# Patient Record
Sex: FEMALE | Race: WHITE | NOT HISPANIC OR LATINO | Employment: FULL TIME | URBAN - METROPOLITAN AREA
[De-identification: names, ages, dates, MRNs, and addresses within clinical notes are randomized per-mention and may not be internally consistent; named-entity substitution may affect disease eponyms.]

---

## 2017-09-28 ENCOUNTER — APPOINTMENT (OUTPATIENT)
Dept: LAB | Facility: MEDICAL CENTER | Age: 47
End: 2017-09-28
Payer: COMMERCIAL

## 2017-09-28 ENCOUNTER — TRANSCRIBE ORDERS (OUTPATIENT)
Dept: ADMINISTRATIVE | Facility: HOSPITAL | Age: 47
End: 2017-09-28

## 2017-09-28 ENCOUNTER — APPOINTMENT (OUTPATIENT)
Dept: URGENT CARE | Facility: MEDICAL CENTER | Age: 47
End: 2017-09-28
Payer: COMMERCIAL

## 2017-09-28 DIAGNOSIS — Z13.9 SCREENING FOR CONDITION: Primary | ICD-10-CM

## 2017-09-28 DIAGNOSIS — Z13.9 SCREENING FOR CONDITION: ICD-10-CM

## 2017-09-28 LAB
CHOLEST SERPL-MCNC: 171 MG/DL (ref 50–200)
EST. AVERAGE GLUCOSE BLD GHB EST-MCNC: 108 MG/DL
HBA1C MFR BLD: 5.4 % (ref 4.2–6.3)
HDLC SERPL-MCNC: 85 MG/DL (ref 40–60)
LDLC SERPL CALC-MCNC: 75 MG/DL (ref 0–100)
TRIGL SERPL-MCNC: 56 MG/DL
TSH SERPL DL<=0.05 MIU/L-ACNC: 0.9 UIU/ML (ref 0.36–3.74)

## 2017-09-28 PROCEDURE — 84443 ASSAY THYROID STIM HORMONE: CPT

## 2017-09-28 PROCEDURE — 80061 LIPID PANEL: CPT

## 2017-09-28 PROCEDURE — 83036 HEMOGLOBIN GLYCOSYLATED A1C: CPT

## 2017-09-28 PROCEDURE — 36415 COLL VENOUS BLD VENIPUNCTURE: CPT

## 2019-06-20 ENCOUNTER — HOSPITAL ENCOUNTER (OUTPATIENT)
Dept: RADIOLOGY | Facility: IMAGING CENTER | Age: 49
Discharge: HOME | End: 2019-06-20
Payer: COMMERCIAL

## 2019-06-20 ENCOUNTER — TRANSCRIBE ORDERS (OUTPATIENT)
Dept: ADMINISTRATIVE | Facility: HOSPITAL | Age: 49
End: 2019-06-20

## 2019-06-20 DIAGNOSIS — Z13.9 ENCOUNTER FOR HEALTH-RELATED SCREENING: ICD-10-CM

## 2019-06-20 DIAGNOSIS — M17.9 OSTEOARTHRITIS OF KNEE, UNSPECIFIED: Primary | ICD-10-CM

## 2019-06-20 DIAGNOSIS — M17.9 OSTEOARTHRITIS OF KNEE, UNSPECIFIED: ICD-10-CM

## 2019-06-20 PROCEDURE — 73562 X-RAY EXAM OF KNEE 3: CPT

## 2019-07-05 ENCOUNTER — APPOINTMENT (OUTPATIENT)
Dept: URGENT CARE | Facility: MEDICAL CENTER | Age: 49
End: 2019-07-05

## 2019-07-05 ENCOUNTER — APPOINTMENT (OUTPATIENT)
Dept: LAB | Facility: MEDICAL CENTER | Age: 49
End: 2019-07-05

## 2019-07-05 DIAGNOSIS — Z13.9 ENCOUNTER FOR HEALTH-RELATED SCREENING: ICD-10-CM

## 2019-07-05 LAB
CHOLEST SERPL-MCNC: 198 MG/DL (ref 50–200)
EST. AVERAGE GLUCOSE BLD GHB EST-MCNC: 111 MG/DL
HBA1C MFR BLD: 5.5 % (ref 4.2–6.3)
HDLC SERPL-MCNC: 81 MG/DL (ref 40–60)
LDLC SERPL CALC-MCNC: 99 MG/DL (ref 0–100)
NONHDLC SERPL-MCNC: 117 MG/DL
TRIGL SERPL-MCNC: 90 MG/DL

## 2019-07-05 PROCEDURE — 80061 LIPID PANEL: CPT

## 2019-07-05 PROCEDURE — 83036 HEMOGLOBIN GLYCOSYLATED A1C: CPT

## 2019-07-05 PROCEDURE — 36415 COLL VENOUS BLD VENIPUNCTURE: CPT

## 2020-11-20 ENCOUNTER — TRANSCRIBE ORDERS (OUTPATIENT)
Dept: NEUROSURGERY | Facility: CLINIC | Age: 50
End: 2020-11-20

## 2020-11-20 DIAGNOSIS — G89.4 CHRONIC PAIN SYNDROME: Primary | ICD-10-CM

## 2020-12-28 ENCOUNTER — CONSULT (OUTPATIENT)
Dept: NEUROSURGERY | Facility: CLINIC | Age: 50
End: 2020-12-28
Payer: COMMERCIAL

## 2020-12-28 VITALS
WEIGHT: 216 LBS | DIASTOLIC BLOOD PRESSURE: 84 MMHG | RESPIRATION RATE: 16 BRPM | HEART RATE: 90 BPM | TEMPERATURE: 98.4 F | SYSTOLIC BLOOD PRESSURE: 126 MMHG | HEIGHT: 61 IN | BODY MASS INDEX: 40.78 KG/M2

## 2020-12-28 DIAGNOSIS — M54.16 LUMBAR RADICULOPATHY: ICD-10-CM

## 2020-12-28 DIAGNOSIS — G89.4 CHRONIC PAIN SYNDROME: Primary | ICD-10-CM

## 2020-12-28 PROCEDURE — 99244 OFF/OP CNSLTJ NEW/EST MOD 40: CPT | Performed by: NEUROLOGICAL SURGERY

## 2020-12-28 RX ORDER — CEFAZOLIN SODIUM 2 G/50ML
2000 SOLUTION INTRAVENOUS ONCE
Status: CANCELLED | OUTPATIENT
Start: 2021-02-23 | End: 2020-12-28

## 2020-12-28 RX ORDER — ARIPIPRAZOLE 2 MG/1
TABLET ORAL DAILY
COMMUNITY
Start: 2020-11-13

## 2020-12-28 RX ORDER — PREGABALIN 100 MG/1
100 CAPSULE ORAL 3 TIMES DAILY
COMMUNITY
Start: 2020-11-27

## 2020-12-28 RX ORDER — ZOLPIDEM TARTRATE 5 MG/1
5 TABLET ORAL
COMMUNITY
Start: 2020-11-13

## 2020-12-28 RX ORDER — METAXALONE 800 MG/1
800 TABLET ORAL 2 TIMES DAILY
COMMUNITY
Start: 2020-12-14

## 2020-12-28 RX ORDER — VENLAFAXINE HYDROCHLORIDE 150 MG/1
CAPSULE, EXTENDED RELEASE ORAL
COMMUNITY
Start: 2019-03-11

## 2020-12-28 RX ORDER — CHLORHEXIDINE GLUCONATE 0.12 MG/ML
15 RINSE ORAL ONCE
Status: CANCELLED | OUTPATIENT
Start: 2020-12-28 | End: 2020-12-28

## 2020-12-28 RX ORDER — TOPIRAMATE 25 MG/1
25 TABLET ORAL 2 TIMES DAILY
COMMUNITY
Start: 2020-11-30

## 2020-12-28 RX ORDER — LORATADINE 10 MG/1
10 TABLET ORAL AS NEEDED
COMMUNITY
Start: 2011-06-17

## 2020-12-28 RX ORDER — HYDROCODONE BITARTRATE AND ACETAMINOPHEN 5; 325 MG/1; MG/1
1 TABLET ORAL 3 TIMES DAILY PRN
COMMUNITY
Start: 2020-12-01

## 2021-01-04 ENCOUNTER — HOSPITAL ENCOUNTER (OUTPATIENT)
Dept: MRI IMAGING | Facility: HOSPITAL | Age: 51
Discharge: HOME | End: 2021-01-04
Payer: COMMERCIAL

## 2021-01-04 ENCOUNTER — HOSPITAL ENCOUNTER (OUTPATIENT)
Dept: RADIOLOGY | Facility: HOSPITAL | Age: 51
Discharge: HOME | End: 2021-01-04

## 2021-01-04 DIAGNOSIS — G89.4 CHRONIC PAIN SYNDROME: ICD-10-CM

## 2021-01-04 DIAGNOSIS — M54.16 LUMBAR RADICULOPATHY: ICD-10-CM

## 2021-01-04 PROCEDURE — G1004 CDSM NDSC: HCPCS

## 2021-01-04 PROCEDURE — 72146 MRI CHEST SPINE W/O DYE: CPT

## 2021-01-05 ENCOUNTER — TELEPHONE (OUTPATIENT)
Dept: NEUROSURGERY | Facility: CLINIC | Age: 51
End: 2021-01-05

## 2021-01-05 NOTE — TELEPHONE ENCOUNTER
Telephoned patient as a f/u to call form MRI regarding abnormalfinding; Impression --  MRI thopracic spine ordered by Dr Indira Keys during recent office visit 12/28/20    IMPRESSION:  1  Minimal degenerative change without evidence of critical stenosis throughout the thoracic spine  2   Right hepatic lobe 16 mm lobulated probable cyst   Correlate with ultrasound      Informed patient I will notify  PCP for f/u  She reports is followed by weight management Dr Rhonda Grullon, has a fatty liver  this   ordered a liver US which she completed last week , This Dr did not mention abnormal findings  She will call the office and request they fax US liver report to her PCP  PCP office notified DR Adilson De Los Santos , spoke with Tracy Tam on nurse line informed of the above  She reports cannot access MRI Thoracici spine in Epic at this time  informed I will have report faxed to the office today   Verified Fax   Earline Bhatia, [facility] [address]   [place] [address]   [phone] (Fax)

## 2021-01-27 ENCOUNTER — OFFICE VISIT (OUTPATIENT)
Dept: NEUROSURGERY | Facility: CLINIC | Age: 51
End: 2021-01-27

## 2021-01-27 ENCOUNTER — LAB (OUTPATIENT)
Dept: LAB | Facility: IMAGING CENTER | Age: 51
End: 2021-01-27
Payer: COMMERCIAL

## 2021-01-27 VITALS
WEIGHT: 211 LBS | SYSTOLIC BLOOD PRESSURE: 120 MMHG | TEMPERATURE: 98.1 F | BODY MASS INDEX: 39.84 KG/M2 | HEART RATE: 86 BPM | DIASTOLIC BLOOD PRESSURE: 77 MMHG | RESPIRATION RATE: 16 BRPM | HEIGHT: 61 IN

## 2021-01-27 DIAGNOSIS — Z79.891 LONG TERM CURRENT USE OF OPIATE ANALGESIC: ICD-10-CM

## 2021-01-27 DIAGNOSIS — M54.16 LUMBAR RADICULOPATHY: ICD-10-CM

## 2021-01-27 DIAGNOSIS — G89.4 CHRONIC PAIN SYNDROME: Primary | ICD-10-CM

## 2021-01-27 DIAGNOSIS — G89.4 CHRONIC PAIN SYNDROME: ICD-10-CM

## 2021-01-27 DIAGNOSIS — M47.816 SPONDYLOSIS WITHOUT MYELOPATHY OR RADICULOPATHY, LUMBAR REGION: ICD-10-CM

## 2021-01-27 LAB
ALBUMIN SERPL BCP-MCNC: 3.9 G/DL (ref 3.5–5)
ALP SERPL-CCNC: 82 U/L (ref 46–116)
ALT SERPL W P-5'-P-CCNC: 27 U/L (ref 12–78)
ANION GAP SERPL CALCULATED.3IONS-SCNC: 2 MMOL/L (ref 4–13)
APTT PPP: 29 SECONDS (ref 23–37)
AST SERPL W P-5'-P-CCNC: 19 U/L (ref 5–45)
B-HCG SERPL-ACNC: 8 MIU/ML
BASOPHILS # BLD AUTO: 0.04 THOUSANDS/ΜL (ref 0–0.1)
BASOPHILS NFR BLD AUTO: 1 % (ref 0–1)
BILIRUB SERPL-MCNC: 0.32 MG/DL (ref 0.2–1)
BILIRUB UR QL STRIP: NEGATIVE
BUN SERPL-MCNC: 11 MG/DL (ref 5–25)
CALCIUM SERPL-MCNC: 9.1 MG/DL (ref 8.3–10.1)
CHLORIDE SERPL-SCNC: 109 MMOL/L (ref 100–108)
CLARITY UR: CLEAR
CO2 SERPL-SCNC: 29 MMOL/L (ref 21–32)
COLOR UR: YELLOW
CREAT SERPL-MCNC: 0.78 MG/DL (ref 0.6–1.3)
EOSINOPHIL # BLD AUTO: 0.07 THOUSAND/ΜL (ref 0–0.61)
EOSINOPHIL NFR BLD AUTO: 2 % (ref 0–6)
ERYTHROCYTE [DISTWIDTH] IN BLOOD BY AUTOMATED COUNT: 13.3 % (ref 11.6–15.1)
EST. AVERAGE GLUCOSE BLD GHB EST-MCNC: 117 MG/DL
GFR SERPL CREATININE-BSD FRML MDRD: 89 ML/MIN/1.73SQ M
GLUCOSE P FAST SERPL-MCNC: 105 MG/DL (ref 65–99)
GLUCOSE UR STRIP-MCNC: NEGATIVE MG/DL
HBA1C MFR BLD: 5.7 %
HCT VFR BLD AUTO: 41.6 % (ref 34.8–46.1)
HGB BLD-MCNC: 12.8 G/DL (ref 11.5–15.4)
HGB UR QL STRIP.AUTO: NEGATIVE
IMM GRANULOCYTES # BLD AUTO: 0.01 THOUSAND/UL (ref 0–0.2)
IMM GRANULOCYTES NFR BLD AUTO: 0 % (ref 0–2)
INR PPP: 0.98 (ref 0.84–1.19)
KETONES UR STRIP-MCNC: NEGATIVE MG/DL
LEUKOCYTE ESTERASE UR QL STRIP: NEGATIVE
LYMPHOCYTES # BLD AUTO: 1.81 THOUSANDS/ΜL (ref 0.6–4.47)
LYMPHOCYTES NFR BLD AUTO: 41 % (ref 14–44)
MCH RBC QN AUTO: 29.3 PG (ref 26.8–34.3)
MCHC RBC AUTO-ENTMCNC: 30.8 G/DL (ref 31.4–37.4)
MCV RBC AUTO: 95 FL (ref 82–98)
MONOCYTES # BLD AUTO: 0.31 THOUSAND/ΜL (ref 0.17–1.22)
MONOCYTES NFR BLD AUTO: 7 % (ref 4–12)
NEUTROPHILS # BLD AUTO: 2.21 THOUSANDS/ΜL (ref 1.85–7.62)
NEUTS SEG NFR BLD AUTO: 49 % (ref 43–75)
NITRITE UR QL STRIP: NEGATIVE
NRBC BLD AUTO-RTO: 0 /100 WBCS
PH UR STRIP.AUTO: 7 [PH]
PLATELET # BLD AUTO: 305 THOUSANDS/UL (ref 149–390)
PMV BLD AUTO: 10.2 FL (ref 8.9–12.7)
POTASSIUM SERPL-SCNC: 4.2 MMOL/L (ref 3.5–5.3)
PROT SERPL-MCNC: 7.7 G/DL (ref 6.4–8.2)
PROT UR STRIP-MCNC: NEGATIVE MG/DL
PROTHROMBIN TIME: 13 SECONDS (ref 11.6–14.5)
RBC # BLD AUTO: 4.37 MILLION/UL (ref 3.81–5.12)
SODIUM SERPL-SCNC: 140 MMOL/L (ref 136–145)
SP GR UR STRIP.AUTO: 1.01 (ref 1–1.03)
UROBILINOGEN UR QL STRIP.AUTO: 0.2 E.U./DL
WBC # BLD AUTO: 4.45 THOUSAND/UL (ref 4.31–10.16)

## 2021-01-27 PROCEDURE — 85025 COMPLETE CBC W/AUTO DIFF WBC: CPT

## 2021-01-27 PROCEDURE — 80053 COMPREHEN METABOLIC PANEL: CPT

## 2021-01-27 PROCEDURE — 85730 THROMBOPLASTIN TIME PARTIAL: CPT

## 2021-01-27 PROCEDURE — 83036 HEMOGLOBIN GLYCOSYLATED A1C: CPT

## 2021-01-27 PROCEDURE — 36415 COLL VENOUS BLD VENIPUNCTURE: CPT

## 2021-01-27 PROCEDURE — 81003 URINALYSIS AUTO W/O SCOPE: CPT | Performed by: NEUROLOGICAL SURGERY

## 2021-01-27 PROCEDURE — 85610 PROTHROMBIN TIME: CPT

## 2021-01-27 PROCEDURE — PREOP: Performed by: NURSE PRACTITIONER

## 2021-01-27 PROCEDURE — 84702 CHORIONIC GONADOTROPIN TEST: CPT

## 2021-01-27 RX ORDER — PHENTERMINE HYDROCHLORIDE 37.5 MG/1
37.5 CAPSULE ORAL EVERY MORNING
COMMUNITY

## 2021-01-27 NOTE — PROGRESS NOTES
Assessment/Plan:    Long term current use of opiate analgesic  · As addressed in HPI  · Prescribed by pain management     Spondylosis without myelopathy or radiculopathy, lumbar region  · As addressed in HPI  · Chronic current use of opiates  · Status post spinal cord simulator  trial , scheduled for surgery for permanent placement     Chronic pain syndrome  As detailed in HPI  She reports Dr Latha Mcfadden has explained in great detail, the procedure, the risks and benefits of surgery,  expected postoperative course, and answered all questions , also providing contact information if future questions arise, she  wishes to proceed with surgery  [employer] [facility]   Dr Latha Mcfadden previously  obtained verbal and written consent  Plan  Preoperative Assessments :   Prior General  Anesthesia Reactions---denies    Exercise Capacity ---   She denies exertional symptoms  of dyspnea or chest pain when climbing 2 flights of stairs or when walking 2 city blocks  Nicotine dependence ----  Does not smoke  Pulmonary: HO TORIE prescribed CPAP,noncompliant with use , will bring on sx day , explained rational     Personal history of venous thromboembolic disease--denies  GI/HepaticMRI Right hepatic lobe 16 mm lobulated probable cyst PCP following, has for many years  Bleeding Tendency ---denies easily bruising , or spontaneous nose or gum bleeds    Nickel or metal reaction/allergy:  History of cancer or other health condition that requires routine MRI imagining---denies conditions    Surgery Clearance:  PCP/Medical Clearance- pending completion scheduled for 2/4/2021     Cardiac:-no clearance required but need EKG required    PAT--done today  And reviewed  Imagining requirement: MRI Thoracic spine 1 4 2021  Images viewed in PACS   Psychology clearance: ; note from [id number] faxed 11/20/20 "cleared for spinal cord stimulator "      In preparation for surgery the following information is explained  Preoperative written instructions provided and reviewed, explained  Medication exclusion  list provided and reviewed - do not take 7 days prior surgery or 14 days postoperatively including  OTC, supplements,  ASA containing  products (Excedrin migraine) and NSAID  Do not drive for 2 week after surgery (deviation from this  restriction is at the discretion of the surgeon)  Explained pre operative skin preparation hygiene care use of products Hibiclens (chlorhexidine) and use Mauro cloth wipes as per protocol, products provided  Explained measures performed on surgery day preop to decrease infection risk and postoperative  A prophylactic  ABX is prescribed the day prior surgery,   start night of surgery and continue as directed   Advised again to read and review the Neurosurgery education booklet   Postoperative activity restrictions were reviewed , follow the basic "BLTs" no bending, no lifting greater than 10 lbs  , no twisting, and no stretching thru 6 weeks post op  Can ambulate as tolerated immediately post op   Avoid repetitive pushing, pulling, or rotating movement of upper extremities  Ambulate as tolerated immediately post op  Take the minimum of 4 short walks per day  Will receive postoperative discharge written instructions explaining incision care and general body hygiene instructions such as when showers can resume  Pain management --current use of opiate prescribed by Dr Italo Muñoz , baldomero use this supply first for postoperative pain management  Dr Chela Dixon office relinquishes opiate prescribing to NSx x 5 days  Postoperative pain management, opioid induced constipation, and bowel hygiene measures discussed in great detail  Product Rep is  present during surgical procedure and subsequent postoperative  visits  Subjective: preoperative H & P     Patient ID: Iris Burnham is a 48 y o  female     HPI   Has a longstanding history of chronic pain syndrome secondary to lumbar radiculopathy and spondylosis    Location of pain in low back and bilateral lower extremities  R>L  Pain distribution L5 nerve root  Characterizes pain as severe and throbbing quality  Managed by Dr Kaitlin [facility]  She failed conservative treatment of physical therapy and steroid injections  Dr Kaitlin Boston performed a [employer] spinal cord stimulator trial 11/9/2020  For which she reports achieving significant relief of chronic pain symptoms, greater than 50 %  Denies prior back surgery  Currently treating with opiate prescribed by Dr Kaitlin Boston  She consulted with DR Gilford Gash 12/28/20 after assessment and review determined she is an appropriate candidate to proceed with surgery  Surgery scheduled for 2/23/2021  with Dr Gilford Gash for  [address], RIGHT (Right Spine Thoracic)  ----[employer]                 REVIEW OF SYSTEMS  Review of Systems   Musculoskeletal: Positive for back pain (occasionally radiates to legs) and gait problem (occasional)  Neurological:        Neuropathy in feet and fingers B/L   Psychiatric/Behavioral: Positive for dysphoric mood (controlled with meds)  All other systems reviewed and are negative          Meds/Allergies     Current Outpatient Medications   Medication Sig Dispense Refill    ARIPiprazole (ABILIFY) 2 mg tablet Take by mouth daily       HYDROcodone-acetaminophen (NORCO) 5-325 mg per tablet Take 1 tablet by mouth 3 (three) times a day as needed      loratadine (Claritin) 10 mg tablet Take 10 mg by mouth as needed       metaxalone (SKELAXIN) 800 mg tablet Take 800 mg by mouth 2 (two) times a day      phentermine 37 5 MG capsule Take 37 5 mg by mouth every morning      pregabalin (LYRICA) 100 mg capsule Take 100 mg by mouth 3 (three) times a day      topiramate (TOPAMAX) 25 mg tablet Take 25 mg by mouth 2 (two) times a day      venlafaxine (EFFEXOR-XR) 150 mg 24 hr capsule TAKE 1 CAPSULE DAILY      zolpidem (AMBIEN) 5 mg tablet Take 5 mg by mouth daily at bedtime        No current facility-administered medications for this visit  Allergies   Allergen Reactions    Bupropion      Headache       PAST HISTORY    History reviewed  No pertinent past medical history  Past Surgical History:   Procedure Laterality Date    CARPAL TUNNEL RELEASE Bilateral     CRANIECTOMY SUBOCCIPITAL W/ CERVICAL LAMINECTOMY / CHIARI  07/2017    TONSILLECTOMY      and uvula    TUBAL LIGATION  1996       Social History     Tobacco Use    Smoking status: Never Smoker    Smokeless tobacco: Never Used   Substance Use Topics    Alcohol use: Never     Frequency: Never    Drug use: Never       Family History   Problem Relation Age of Onset    Colon cancer Father    [facility] HIV Brother    [facility] Spina bifida Son        The following portions of the patient's history were reviewed and updated as appropriate: allergies, current medications, past family history, past medical history, past social history, past surgical history and problem list       EXAM    Vitals:Blood pressure 120/77, pulse 86, temperature 98 1 °F (36 7 °C), temperature source Tympanic, resp  rate 16, height 5' 1" (1 549 m), weight 95 7 kg (211 lb)  ,Body mass index is 39 87 kg/m²  Physical Exam  Vitals signs and nursing note reviewed  Constitutional:       Appearance: Normal appearance  HENT:      Head: Normocephalic and atraumatic  Eyes:      General: No scleral icterus  Right eye: No discharge  Left eye: No discharge  Conjunctiva/sclera: Conjunctivae normal    Neck:      Musculoskeletal: Normal range of motion  Cardiovascular:      Rate and Rhythm: Normal rate and regular rhythm  Pulses: Normal pulses  Heart sounds: Normal heart sounds  Pulmonary:      Effort: Pulmonary effort is normal  No respiratory distress  Breath sounds: Normal breath sounds  Abdominal:      General: Bowel sounds are normal       Palpations: Abdomen is soft  Comments: Obese abdomen   Musculoskeletal:         General: No swelling or tenderness  Right lower leg: No edema  Left lower leg: No edema  Skin:     General: Skin is warm and dry  Neurological:      General: No focal deficit present  Mental Status: She is alert and oriented to person, place, and time  Gait: Gait is intact  Psychiatric:         Behavior: Behavior normal          Neurologic Exam     Mental Status   Oriented to person, place, and time  Level of consciousness: alert    Motor Exam     Strength   Right iliopsoas: 5/5  Left iliopsoas: 5/5  Right quadriceps: 5/5  Left quadriceps: 5/5  Right hamstrin/5  Left hamstrin/5  Right anterior tibial: 5/5  Left anterior tibial: 5/5  Right gastroc: 5/5  Left gastroc: 5/5    Gait, Coordination, and Reflexes     Gait  Gait: normal      Imaging Studies  Mri Thoracic Spine Without Contrast    Result Date: 2021  Narrative: MRI THORACIC SPINE WITHOUT CONTRAST INDICATION: G89 4: Chronic pain syndrome M54 16: Radiculopathy, lumbar region  Presurgical evaluation for spinal cord stimulator  COMPARISON:  None  TECHNIQUE:  Sagittal T1, sagittal T2, sagittal inversion recovery, axial T2,  axial 2D MERGE  IMAGE QUALITY: Diagnostic  FINDINGS: ALIGNMENT: Normal alignment of the thoracic spine  No compression fracture  No subluxation  No scoliosis  MARROW SIGNAL:  Normal marrow signal is identified within the visualized bony structures with the exception of minimal reactive endplate marrow changes at mid and lower thoracic levels  No discrete marrow lesion  THORACIC CORD: Normal signal within the thoracic cord  PARAVERTEBRAL SOFT TISSUES:  Right hepatic lobe 16 mm cystic appearing lesion  Correlate with ultrasound  THORACIC DEGENERATIVE CHANGE: Minimal degenerative changes without focal disc herniation or significant foraminal stenosis  Impression: 1    Minimal degenerative change without evidence of critical stenosis throughout the thoracic spine  2   Right hepatic lobe 16 mm lobulated probable cyst   Correlate with ultrasound  Workstation performed: [id number]         I have personally reviewed pertinent reports     and I have personally reviewed pertinent films in PACS

## 2021-01-28 ENCOUNTER — TELEPHONE (OUTPATIENT)
Dept: NEUROSURGERY | Facility: CLINIC | Age: 51
End: 2021-01-28

## 2021-01-28 PROBLEM — Z79.891 LONG TERM CURRENT USE OF OPIATE ANALGESIC: Status: ACTIVE | Noted: 2021-01-28

## 2021-01-28 PROBLEM — M47.816 SPONDYLOSIS WITHOUT MYELOPATHY OR RADICULOPATHY, LUMBAR REGION: Status: ACTIVE | Noted: 2021-01-28

## 2021-01-28 PROBLEM — G89.4 CHRONIC PAIN SYNDROME: Status: ACTIVE | Noted: 2021-01-28

## 2021-01-28 PROBLEM — M54.16 LUMBAR RADICULOPATHY: Status: ACTIVE | Noted: 2021-01-28

## 2021-01-28 NOTE — TELEPHONE ENCOUNTER
Patient gave provider disability forms to complete 1/27/2021    Tried to reach patient, no answer, [facility]    Forms are completed, employee information requests employee signature and date   Asked patient to return call to my direct line to advise if she wishes to pick forms up, have them emailed to her or faxed to [address] [place] [address]

## 2021-01-28 NOTE — TELEPHONE ENCOUNTER
Patient returned call, LMOM asking for forms be emailed to her personal email provided  Emailed forms, returned call and spoke to patient, let her know they were sent to her via email and to please call if she has any furhter needs  Patient was appreciative

## 2021-01-28 NOTE — ASSESSMENT & PLAN NOTE
As detailed in HPI  She reports Dr Terry Nickerson has explained in great detail, the procedure, the risks and benefits of surgery,  expected postoperative course, and answered all questions , also providing contact information if future questions arise, she  wishes to proceed with surgery  [facility]   Dr Terry Nickerson previously  obtained verbal and written consent  Plan  Preoperative Assessments :   Prior General  Anesthesia Reactions---denies    Exercise Capacity ---   She denies exertional symptoms  of dyspnea or chest pain when climbing 2 flights of stairs or when walking 2 city blocks  Nicotine dependence ----  Does not smoke  Pulmonary----HO TORIE prescribed CPAP,noncompliant with use , will bring on sx day , explained rational     Personal history of venous thromboembolic disease--denies  GI/Hepatic---MRI Right hepatic lobe 16 mm lobulated probable cyst PCP following, has for many years  Bleeding Tendency ---denies easily bruising , or spontaneous nose or gum bleeds    Nickel or metal reaction/allergy--denies   History of cancer or other health condition that requires routine MRI imagining---denies conditions    Surgery Clearance:  PCP/Medical Clearance- pending completion scheduled for 2/4/2021     Cardiac:-no clearance required but need EKG required    PAT--done today  And reviewed  Imagining requirement: MRI Thoracic spine 1 4 2021  Images viewed in PACS   Psychology clearance: ; note from [facility] faxed 11/20/20 "cleared for spinal cord stimulator "  In preparation for surgery the following information is explained  Preoperative written instructions provided and reviewed, explained  Medication exclusion  list provided and reviewed - do not take 7 days prior surgery or 14 days postoperatively including  OTC, supplements,  ASA containing  products (Excedrin migraine) and NSAID      Do not drive for 2 week after surgery (deviation from this  restriction is at the discretion of the surgeon)  Explained pre operative skin preparation hygiene care use of products Hibiclens (chlorhexidine) and use Mauro cloth wipes as per protocol, products provided  Explained measures performed on surgery day preop to decrease infection risk and postoperative  A prophylactic  ABX is prescribed the day prior surgery,   start night of surgery and continue as directed   Advised again to read and review the Neurosurgery education booklet   Postoperative activity restrictions were reviewed , follow the basic "BLTs" no bending, no lifting greater than 10 lbs  , no twisting, and no stretching thru 6 weeks post op  Can ambulate as tolerated immediately post op   Avoid repetitive pushing, pulling, or rotating movement of upper extremities  Ambulate as tolerated immediately post op  Take the minimum of 4 short walks per day  Will receive postoperative discharge written instructions explaining incision care and general body hygiene instructions such as when showers can resume  Pain management --current use of opiate prescribed by Dr Rc Adkins , baldomero use this supply first for postoperative pain management  Dr Moustapha Kimball office relinquishes opiate prescribing to NSx x 5 days  Postoperative pain management, opioid induced constipation, and bowel hygiene measures discussed in great detail  Product Rep is  present during surgical procedure and subsequent postoperative  visits

## 2021-02-09 NOTE — PRE-PROCEDURE INSTRUCTIONS
Pre-Surgery Instructions:   Medication Instructions    ARIPiprazole (ABILIFY) 2 mg tablet Instructed patient per Anesthesia Guidelines  continue, take am of sx    HYDROcodone-acetaminophen (NORCO) 5-325 mg per tablet Instructed patient per Anesthesia Guidelines  continue, take PRN am of sx    loratadine (Claritin) 10 mg tablet Instructed patient per Anesthesia Guidelines  PRN    metaxalone (SKELAXIN) 800 mg tablet Instructed patient per Anesthesia Guidelines  continue, take of sx    phentermine 37 5 MG capsule Instructed patient per Anesthesia Guidelines  stopped by patient    pregabalin (LYRICA) 100 mg capsule Instructed patient per Anesthesia Guidelines  continue, take am of sx    topiramate (TOPAMAX) 25 mg tablet Instructed patient per Anesthesia Guidelines  continue, take am of sx    venlafaxine (EFFEXOR-XR) 150 mg 24 hr capsule Instructed patient per Anesthesia Guidelines  continue, take am of sx    zolpidem (AMBIEN) 5 mg tablet Instructed patient per Anesthesia Guidelines  continue, HS    Acetaminophen Med Class    Continue to take this medication on your normal schedule  If this is an oral medication and you take it in the morning, then you may take this medicine with a sip of water  Antidepressant Med Class    Continue to take this medication on your normal schedule  If this is an oral medication and you take it in the morning, then you may take this medicine with a sip of water  Antiepileptic Med Class    Continue to take this medication on your normal schedule  If this is an oral medication and you take it in the morning, then you may take this medicine with a sip of water  Antipsychotic Med Class    Continue to take this medication on your normal schedule  If this is an oral medication and you take it in the morning, then you may take this medicine with a sip of water  Opioid Med Class    Continue to take this medication on your normal schedule    If this is an oral medication and you take it in the morning, then you may take this medicine with a sip of water  Zolpidem Med Class    Continue this medication up to the evening before surgery/procedure, but do not take the morning of the day of surgery  You will receive a phone call from hospital for arrival time  Please call surgeons office if any changes in your condition  Wear easy on/off clothing; consider type of surgery;  valuables and jewelry please keep at home  **COVID-19  education done  Please: No contacts or eye make up or artificial eyelashes    Please secure transportation     Follow pre surgery showering or cleaning instructions as  Reviewed by nurse or surgeons office      Questions answered and concerns addressed

## 2021-02-17 ENCOUNTER — TELEPHONE (OUTPATIENT)
Dept: NEUROSURGERY | Facility: CLINIC | Age: 51
End: 2021-02-17

## 2021-02-17 NOTE — TELEPHONE ENCOUNTER
Patient called LMOM that she would like to fax FMLA forms to be completed  Tried to return call to patient, no answer, LMOM and provided fax #

## 2021-02-17 NOTE — TELEPHONE ENCOUNTER
FMLA forms received, completed, emailed to patient  Called patient and let her know they were completed and sent

## 2021-02-22 ENCOUNTER — TELEPHONE (OUTPATIENT)
Dept: NEUROSURGERY | Facility: CLINIC | Age: 51
End: 2021-02-22

## 2021-02-22 ENCOUNTER — DOCUMENTATION (OUTPATIENT)
Dept: NEUROSURGERY | Facility: CLINIC | Age: 51
End: 2021-02-22

## 2021-02-22 DIAGNOSIS — Z79.2 PROPHYLACTIC ANTIBIOTIC: Primary | ICD-10-CM

## 2021-02-22 DIAGNOSIS — Z98.890 POSTOPERATIVE STATE: ICD-10-CM

## 2021-02-22 RX ORDER — CEPHALEXIN 500 MG/1
500 CAPSULE ORAL EVERY 6 HOURS SCHEDULED
Qty: 12 CAPSULE | Refills: 0 | Status: SHIPPED | OUTPATIENT
Start: 2021-02-22 | End: 2021-02-25

## 2021-02-22 NOTE — TELEPHONE ENCOUNTER
Pre operative call day prior surgery scheduled in the AM w/ Dr Terra Jeffries, RIGHT (Right Spine Thoracic)  Discussion/Review    Allergies ---Reviewed   Hold medications --- Reviewed   NPO after MN, night prior surgery ---Reviewed as instructed by ASU nurse  Medication (s) instructed by healthcare provider to take the morning of surgery w/ sip of water 4 OZ discussed: as instructed by ASU nurse  Post operative antibiotic electronic transmission to pharmacy: cephalexin   PDMP site reviewed accessed and reviewed scheduled drug list printed and scanned into record  Pain management script:has supply for Dr Syl Keita --will use  this supply initially post op  When depleted NSX will replace up to 30 tablets -----Advised to take one every 4 hours as needed form postoperative pain x 5 days , neurosurgery cannot prescribe greater than 5 days , will need to f/u with Dr Syl Keita  Pre- operative shower protocol reviewed; Clarify instructions as per protocol, third chlorhexidine shower tonight before surgery, then use OLGA wipes as per packaging instructions, Use a clean towel and wash cloth starting tonight and continue nightly until seen 2 weeks post operative visit for incision check removal  Change bed linens tonight and continue at least 1-2 times weekly  --reinforced     Informed will receive a telephone call tonight from a hospital representative with time to report on surgery day: pending call     Informed will receive a f/u call within in 24 -48 hours post-op to assess recovery reinforce instructions, and to answer any questions   Wednesday       Follow-up appointments reviewed: documented in discharge paper work 2 week      6 week    3/9/2021 10:00 AM (Arrive by 9:45 AM) Andre Casillas, [facility]   4/5/2021 10:30 AM (Arrive by 10:15 AM) Alexsander Mora MD         Patient verbalized understanding information provided /discussed

## 2021-02-23 ENCOUNTER — HOSPITAL ENCOUNTER (OUTPATIENT)
Facility: HOSPITAL | Age: 51
Setting detail: OUTPATIENT SURGERY
Discharge: HOME | End: 2021-02-23
Attending: NEUROLOGICAL SURGERY | Admitting: NEUROLOGICAL SURGERY
Payer: COMMERCIAL

## 2021-02-23 ENCOUNTER — APPOINTMENT (OUTPATIENT)
Dept: RADIOLOGY | Facility: HOSPITAL | Age: 51
End: 2021-02-23
Payer: COMMERCIAL

## 2021-02-23 ENCOUNTER — ANESTHESIA EVENT (OUTPATIENT)
Dept: PERIOP | Facility: HOSPITAL | Age: 51
End: 2021-02-23
Payer: COMMERCIAL

## 2021-02-23 ENCOUNTER — ANESTHESIA (OUTPATIENT)
Dept: PERIOP | Facility: HOSPITAL | Age: 51
End: 2021-02-23
Payer: COMMERCIAL

## 2021-02-23 VITALS
WEIGHT: 208.6 LBS | DIASTOLIC BLOOD PRESSURE: 58 MMHG | BODY MASS INDEX: 38.39 KG/M2 | TEMPERATURE: 98.4 F | SYSTOLIC BLOOD PRESSURE: 113 MMHG | HEIGHT: 62 IN | OXYGEN SATURATION: 97 % | HEART RATE: 98 BPM | RESPIRATION RATE: 20 BRPM

## 2021-02-23 VITALS — HEART RATE: 74 BPM

## 2021-02-23 PROBLEM — M79.7 FIBROMYALGIA: Status: ACTIVE | Noted: 2021-02-23

## 2021-02-23 PROBLEM — E66.9 OBESITY (BMI 30-39.9): Status: ACTIVE | Noted: 2021-02-23

## 2021-02-23 PROBLEM — Z96.89 S/P INSERTION OF SPINAL CORD STIMULATOR: Status: ACTIVE | Noted: 2021-02-23

## 2021-02-23 PROBLEM — G93.5 CHIARI I MALFORMATION (HCC): Status: ACTIVE | Noted: 2021-02-23

## 2021-02-23 PROBLEM — G47.30 SLEEP APNEA: Status: ACTIVE | Noted: 2021-02-23

## 2021-02-23 PROCEDURE — C1778 LEAD, NEUROSTIMULATOR: HCPCS | Performed by: NEUROLOGICAL SURGERY

## 2021-02-23 PROCEDURE — 63650 IMPLANT NEUROELECTRODES: CPT | Performed by: PHYSICIAN ASSISTANT

## 2021-02-23 PROCEDURE — 63685 INS/RPLC SPI NPG/RCVR POCKET: CPT | Performed by: PHYSICIAN ASSISTANT

## 2021-02-23 PROCEDURE — 63685 INS/RPLC SPI NPG/RCVR POCKET: CPT | Performed by: NEUROLOGICAL SURGERY

## 2021-02-23 PROCEDURE — C1787 PATIENT PROGR, NEUROSTIM: HCPCS | Performed by: NEUROLOGICAL SURGERY

## 2021-02-23 PROCEDURE — 95972 ALYS CPLX SP/PN NPGT W/PRGRM: CPT | Performed by: NEUROLOGICAL SURGERY

## 2021-02-23 PROCEDURE — 72072 X-RAY EXAM THORAC SPINE 3VWS: CPT

## 2021-02-23 PROCEDURE — 63650 IMPLANT NEUROELECTRODES: CPT | Performed by: NEUROLOGICAL SURGERY

## 2021-02-23 PROCEDURE — C1820 GENERATOR NEURO RECHG BAT SY: HCPCS | Performed by: NEUROLOGICAL SURGERY

## 2021-02-23 DEVICE — INS 97715 INTELLIS SENSOR MRI US EMANUAL
Type: IMPLANTABLE DEVICE | Site: FLANK | Status: FUNCTIONAL
Brand: INTELLIS™ ADAPTIVESTIM®

## 2021-02-23 DEVICE — LEAD 977A260 VECTRIS MRICS SUBCOMPACT
Type: IMPLANTABLE DEVICE | Site: SPINE THORACIC | Status: FUNCTIONAL
Brand: VECTRIS™ SURESCAN®

## 2021-02-23 RX ORDER — ROCURONIUM BROMIDE 10 MG/ML
INJECTION, SOLUTION INTRAVENOUS AS NEEDED
Status: DISCONTINUED | OUTPATIENT
Start: 2021-02-23 | End: 2021-02-23

## 2021-02-23 RX ORDER — CHLORHEXIDINE GLUCONATE 0.12 MG/ML
15 RINSE ORAL ONCE
Status: COMPLETED | OUTPATIENT
Start: 2021-02-23 | End: 2021-02-23

## 2021-02-23 RX ORDER — SODIUM CHLORIDE, SODIUM LACTATE, POTASSIUM CHLORIDE, CALCIUM CHLORIDE 600; 310; 30; 20 MG/100ML; MG/100ML; MG/100ML; MG/100ML
75 INJECTION, SOLUTION INTRAVENOUS CONTINUOUS
Status: DISCONTINUED | OUTPATIENT
Start: 2021-02-23 | End: 2021-02-23 | Stop reason: HOSPADM

## 2021-02-23 RX ORDER — PROPOFOL 10 MG/ML
INJECTION, EMULSION INTRAVENOUS AS NEEDED
Status: DISCONTINUED | OUTPATIENT
Start: 2021-02-23 | End: 2021-02-23

## 2021-02-23 RX ORDER — CEFAZOLIN SODIUM 2 G/50ML
2000 SOLUTION INTRAVENOUS ONCE
Status: COMPLETED | OUTPATIENT
Start: 2021-02-23 | End: 2021-02-23

## 2021-02-23 RX ORDER — FENTANYL CITRATE 50 UG/ML
INJECTION, SOLUTION INTRAMUSCULAR; INTRAVENOUS AS NEEDED
Status: DISCONTINUED | OUTPATIENT
Start: 2021-02-23 | End: 2021-02-23

## 2021-02-23 RX ORDER — LABETALOL 20 MG/4 ML (5 MG/ML) INTRAVENOUS SYRINGE
5
Status: DISCONTINUED | OUTPATIENT
Start: 2021-02-23 | End: 2021-02-23 | Stop reason: HOSPADM

## 2021-02-23 RX ORDER — METOCLOPRAMIDE HYDROCHLORIDE 5 MG/ML
10 INJECTION INTRAMUSCULAR; INTRAVENOUS ONCE AS NEEDED
Status: DISCONTINUED | OUTPATIENT
Start: 2021-02-23 | End: 2021-02-23 | Stop reason: HOSPADM

## 2021-02-23 RX ORDER — HYDRALAZINE HYDROCHLORIDE 20 MG/ML
5 INJECTION INTRAMUSCULAR; INTRAVENOUS
Status: DISCONTINUED | OUTPATIENT
Start: 2021-02-23 | End: 2021-02-23 | Stop reason: HOSPADM

## 2021-02-23 RX ORDER — PROPOFOL 10 MG/ML
INJECTION, EMULSION INTRAVENOUS CONTINUOUS PRN
Status: DISCONTINUED | OUTPATIENT
Start: 2021-02-23 | End: 2021-02-23

## 2021-02-23 RX ORDER — MIDAZOLAM HYDROCHLORIDE 2 MG/2ML
INJECTION, SOLUTION INTRAMUSCULAR; INTRAVENOUS AS NEEDED
Status: DISCONTINUED | OUTPATIENT
Start: 2021-02-23 | End: 2021-02-23

## 2021-02-23 RX ORDER — HYDROMORPHONE HCL/PF 1 MG/ML
0.5 SYRINGE (ML) INJECTION
Status: DISCONTINUED | OUTPATIENT
Start: 2021-02-23 | End: 2021-02-23 | Stop reason: HOSPADM

## 2021-02-23 RX ORDER — MEPERIDINE HYDROCHLORIDE 25 MG/ML
12.5 INJECTION INTRAMUSCULAR; INTRAVENOUS; SUBCUTANEOUS
Status: DISCONTINUED | OUTPATIENT
Start: 2021-02-23 | End: 2021-02-23 | Stop reason: HOSPADM

## 2021-02-23 RX ORDER — HYDROMORPHONE HCL/PF 1 MG/ML
0.2 SYRINGE (ML) INJECTION
Status: DISCONTINUED | OUTPATIENT
Start: 2021-02-23 | End: 2021-02-23 | Stop reason: HOSPADM

## 2021-02-23 RX ORDER — HYDROCODONE BITARTRATE AND ACETAMINOPHEN 5; 325 MG/1; MG/1
1 TABLET ORAL EVERY 6 HOURS PRN
Status: DISCONTINUED | OUTPATIENT
Start: 2021-02-23 | End: 2021-02-23 | Stop reason: HOSPADM

## 2021-02-23 RX ORDER — ONDANSETRON 2 MG/ML
4 INJECTION INTRAMUSCULAR; INTRAVENOUS ONCE AS NEEDED
Status: DISCONTINUED | OUTPATIENT
Start: 2021-02-23 | End: 2021-02-23 | Stop reason: HOSPADM

## 2021-02-23 RX ORDER — LIDOCAINE HYDROCHLORIDE AND EPINEPHRINE 10; 10 MG/ML; UG/ML
INJECTION, SOLUTION INFILTRATION; PERINEURAL AS NEEDED
Status: DISCONTINUED | OUTPATIENT
Start: 2021-02-23 | End: 2021-02-23 | Stop reason: HOSPADM

## 2021-02-23 RX ORDER — MAGNESIUM HYDROXIDE 1200 MG/15ML
LIQUID ORAL AS NEEDED
Status: DISCONTINUED | OUTPATIENT
Start: 2021-02-23 | End: 2021-02-23 | Stop reason: HOSPADM

## 2021-02-23 RX ORDER — FENTANYL CITRATE/PF 50 MCG/ML
25 SYRINGE (ML) INJECTION
Status: DISCONTINUED | OUTPATIENT
Start: 2021-02-23 | End: 2021-02-23 | Stop reason: HOSPADM

## 2021-02-23 RX ORDER — PROMETHAZINE HYDROCHLORIDE 25 MG/ML
6.25 INJECTION, SOLUTION INTRAMUSCULAR; INTRAVENOUS ONCE AS NEEDED
Status: DISCONTINUED | OUTPATIENT
Start: 2021-02-23 | End: 2021-02-23 | Stop reason: HOSPADM

## 2021-02-23 RX ORDER — BUPIVACAINE HYDROCHLORIDE 2.5 MG/ML
INJECTION, SOLUTION EPIDURAL; INFILTRATION; INTRACAUDAL AS NEEDED
Status: DISCONTINUED | OUTPATIENT
Start: 2021-02-23 | End: 2021-02-23 | Stop reason: HOSPADM

## 2021-02-23 RX ORDER — SUCCINYLCHOLINE/SOD CL,ISO/PF 100 MG/5ML
SYRINGE (ML) INTRAVENOUS AS NEEDED
Status: DISCONTINUED | OUTPATIENT
Start: 2021-02-23 | End: 2021-02-23

## 2021-02-23 RX ADMIN — FENTANYL CITRATE 100 MCG: 50 INJECTION, SOLUTION INTRAMUSCULAR; INTRAVENOUS at 14:16

## 2021-02-23 RX ADMIN — HYDROCODONE BITARTRATE AND ACETAMINOPHEN 1 TABLET: 5; 325 TABLET ORAL at 15:34

## 2021-02-23 RX ADMIN — FENTANYL CITRATE 50 MCG: 50 INJECTION, SOLUTION INTRAMUSCULAR; INTRAVENOUS at 13:41

## 2021-02-23 RX ADMIN — FENTANYL CITRATE 25 MCG: 50 INJECTION, SOLUTION INTRAMUSCULAR; INTRAVENOUS at 15:18

## 2021-02-23 RX ADMIN — FENTANYL CITRATE 25 MCG: 50 INJECTION, SOLUTION INTRAMUSCULAR; INTRAVENOUS at 15:13

## 2021-02-23 RX ADMIN — MIDAZOLAM 2 MG: 1 INJECTION INTRAMUSCULAR; INTRAVENOUS at 13:39

## 2021-02-23 RX ADMIN — PROPOFOL 75 MCG/KG/MIN: 10 INJECTION, EMULSION INTRAVENOUS at 13:51

## 2021-02-23 RX ADMIN — CEFAZOLIN SODIUM 2000 MG: 2 SOLUTION INTRAVENOUS at 13:39

## 2021-02-23 RX ADMIN — PROPOFOL 150 MG: 10 INJECTION, EMULSION INTRAVENOUS at 13:42

## 2021-02-23 RX ADMIN — PROPOFOL 50 MG: 10 INJECTION, EMULSION INTRAVENOUS at 14:07

## 2021-02-23 RX ADMIN — SODIUM CHLORIDE, SODIUM LACTATE, POTASSIUM CHLORIDE, AND CALCIUM CHLORIDE 75 ML/HR: .6; .31; .03; .02 INJECTION, SOLUTION INTRAVENOUS at 11:15

## 2021-02-23 RX ADMIN — ROCURONIUM BROMIDE 5 MG: 10 INJECTION, SOLUTION INTRAVENOUS at 13:41

## 2021-02-23 RX ADMIN — Medication 100 MG: at 13:43

## 2021-02-23 RX ADMIN — FENTANYL CITRATE 50 MCG: 50 INJECTION, SOLUTION INTRAMUSCULAR; INTRAVENOUS at 13:50

## 2021-02-23 RX ADMIN — CHLORHEXIDINE GLUCONATE 0.12% ORAL RINSE 15 ML: 1.2 LIQUID ORAL at 11:12

## 2021-02-23 RX ADMIN — PROPOFOL 50 MG: 10 INJECTION, EMULSION INTRAVENOUS at 13:43

## 2021-02-23 NOTE — ANESTHESIA PREPROCEDURE EVALUATION
Procedure:  INSERTION THORACIC DORSAL COLUMN SPINAL CORD STIMULATOR PERCUTANEOUS W IMPLANTABLE PULSE GENERATOR, RIGHT (Right Spine Thoracic)    Relevant Problems   ANESTHESIA (within normal limits)      CARDIO (within normal limits)      ENDO (within normal limits)      GI/HEPATIC (within normal limits)      /RENAL (within normal limits)      GYN (within normal limits)      HEMATOLOGY (within normal limits)      MUSCULOSKELETAL   (+) Fibromyalgia   (+) Spondylosis without myelopathy or radiculopathy, lumbar region      NEURO/PSYCH   (+) Chronic pain syndrome   (+) Fibromyalgia      PULMONARY   (+) Sleep apnea   (-) Smoking      Other   (+) Chiari I malformation (HCC)   (+) Lumbar radiculopathy   (+) Obesity (BMI 30-39  9)        Physical Exam    Airway    Mallampati score: II  TM Distance: >3 FB  Neck ROM: full     Dental   No notable dental hx     Cardiovascular  Rhythm: regular, Rate: normal, Cardiovascular exam normal    Pulmonary  Pulmonary exam normal Breath sounds clear to auscultation,     Other Findings        Anesthesia Plan  ASA Score- 2     Anesthesia Type- general with ASA Monitors  Additional Monitors:   Airway Plan: ETT  Plan Factors-Exercise tolerance (METS): >4 METS  Chart reviewed  Existing labs reviewed  Patient summary reviewed  Patient is not a current smoker  Induction- intravenous  Postoperative Plan- Plan for postoperative opioid use  Informed Consent- Anesthetic plan and risks discussed with patient  I personally reviewed this patient with the CRNA  Discussed and agreed on the Anesthesia Plan with the CRNA  Navdeep Hutson

## 2021-02-23 NOTE — DISCHARGE INSTRUCTIONS
Discharge Instructions  Spinal Cord Stimulator (SCS)  Activity:  1  Do not lift more than 10 pounds for 6 weeks  2  Avoid bending, lifting and twisting for 6 weeks  3  No strenuous activities  No driving for 2 weeks  4  When able to shower, continue to use clean towel and washcloth for 2 weeks post-op  5  Continue to change bed linens and pajamas more frequently  Wear clean clothes daily  Surgical incision care: For Permanent SCS placement:   1  Keep incisions dry for 3 days  2  May shower with mild antimicrobial soap after 3 days  3  After 3 days, incisions may be left open to air, but must remain clean  For Trial SCS placement:   1  Wires and stimulator will be secured with a dressing  Do NOT remove  Keep dressing DRY  2  No showering with Trial in place  Sponge bath only  Do not immerse the incisions in water for 6 weeks  Do not apply any creams or ointments to the incision for 6 weeks, unless otherwise instructed by [facility]  Contact office if increasing redness, drainage, pain or swelling around the incisions  Postoperative medication:  Complete course of antibiotic as directed  1  For permanent spinal cord stimulators: 3 days   2  For spinal cord stimulator trials: Continue for duration of trial and 3 days after leads are pulled unless directed otherwise  [address] will provide pain medication as coordinated with your pain specialist  All prescriptions must come from a single provider  1  Take all medications as prescribed  Call office with any questions/concerns  Please contact office for questions regarding dosage and modifications  No antiplatelet, anticoagulation or Nonsteroidal anti-inflammatory (NSAIDs) medication until cleared by [address], unless otherwise instructed  Do not operate heavy machinery or vehicles while taking sedating medications     Use a bowel regimen while on opioids as they induce constipation  Ie  Senokot-S, Miralax, Colace, etc  Increase fiber and water intake  ***Note that it may take some time for the stimulator to improve chronic pain symptoms  Adjustment of the stimulator program can begin 2 weeks after placement  For TRIALS, there will be ongoing communication with the rep and adjustments as indicated  Please contact the stimulator representative if you have any questions regarding programing  ***

## 2021-02-23 NOTE — OP NOTE
OPERATIVE REPORT  PATIENT NAME: Chivo Coreas    :  1970  MRN: [id number]  Pt Location: [facility] OR ROOM 03    SURGERY DATE: 2021    Surgeon(s) and Role:     * Pablo Rosales MD - Primary     * Skylar Blair PA-C - Assisting    Preop Diagnosis:  Chronic pain syndrome [G89 4]  Lumbar radiculopathy [M54 16]    Post-Op Diagnosis Codes:     * Chronic pain syndrome [G89 4]     * Lumbar radiculopathy [M54 16]    Procedure(s) (LRB):  INSERTION THORACIC DORSAL COLUMN SPINAL CORD STIMULATOR PERCUTANEOUS W IMPLANTABLE PULSE GENERATOR, RIGHT (Right)    Specimen(s):  * No specimens in log *    Estimated Blood Loss:   Minimal    Drains:  * No LDAs found *    Anesthesia Type:   General    Operative Indications:  Chronic pain syndrome [G89 4]  Lumbar radiculopathy [M54 16]      Operative Findings:  See dictated note  [employer]  SN: [id number]    Complications:   None    Procedure and Technique:  The patient was taken to operative theater induced under general anesthesia and intubated she was positioned prone a Elmer frame  The sweet spot during the trial was planned at T8-9  An right flank incision was planned for the generator she was prepped and draped in sterile fashion  We made an incision with a 10 blade and performed electrocautery down to facsia  We used an epidmed needle to gain epidural access via loss of resistance technique  We traveled two electrodes through two different epidural access needles up to towards approximately T8 and T9  We confirmed placement of the the lead based on fluoro  We tested the EMG response by altering the following parameters using a neurostimulation device   We programmed the following:    Frequency: 10-60 hertz   Pulse width of 350 us  Amplitudes: 0-10 mA   Contacts: midline contacts alternating contacts on the 16 contact electrode  Configuration: Bipolar with (+) and (-)  Cycling: None  Waveform: Tonic    We obtained the appropriate EMG response on the right and left legs    The pocket on the right flank was created with an knife and electrocautery  We tunneled that electrodes connected to the impulse generator and this was connected  We then tested impedances which were normal     The leads been anchored down using anchors under serial of fluoroscopy  After all hardware was in place we irrigated each wound  And then closed in layers using 2 0 Vicryl for the subcutaneous tissues and monocryl for the skin sterile dressing was placed  Patient was repositioned supine the hospital bed extubated to room air  She was taken to the postop recovery area stable condition  All needle and sponge counts were correct at the procedure  All neuromonitoring remained stable during the procedure      I was present for the entire procedure, A qualified resident physician was not available and A physician assistant was required during the procedure for retraction tissue handling,dissection and suturing    Patient Disposition:  PACU     SIGNATURE: Gemma Davenport MD  DATE: February 23, 2021  TIME: 2:37 PM

## 2021-02-24 ENCOUNTER — TELEMEDICINE (OUTPATIENT)
Dept: NEUROSURGERY | Facility: CLINIC | Age: 51
End: 2021-02-24

## 2021-02-24 DIAGNOSIS — Z98.890 STATUS POST SURGERY: Primary | ICD-10-CM

## 2021-02-24 PROCEDURE — 99024 POSTOP FOLLOW-UP VISIT: CPT

## 2021-02-24 NOTE — PROGRESS NOTES
Virtual Brief Visit    Assessment/Plan:    Problem List Items Addressed This Visit     None      Visit Diagnoses     Status post surgery    -  Primary                Reason for visit is   Chief Complaint   Patient presents with    Virtual Brief Visit        Encounter provider Cari Leyva RN    Provider located at [address] [place] [address] Dr  [address] [place] [id number]    Recent Visits  No visits were found meeting these conditions  Showing recent visits within past 7 days and meeting all other requirements     Today's Visits  Date Type Provider Dept   02/24/21 Telemedicine Cari Leyva RN  [facility]   Showing today's visits and meeting all other requirements     Future Appointments  No visits were found meeting these conditions  Showing future appointments within next 150 days and meeting all other requirements        After connecting through telephone, the patient was identified by name and date of birth  Stella Frederick was informed that this is a telemedicine visit and that the visit is being conducted through telephone  My office door was closed  No one else was in the room  She acknowledged consent and understanding of privacy and security of the platform  The patient has agreed to participate and understands she can discontinue the visit at any time  Patient is aware this is a billable service  Shayla Aparicio is a 48 y o  female s/p: [facility], [facility]  Spoke with patient to see how she is doing after surgery  Patient reports she is doing well overall and denies any incisional issues or fevers  Patient is able to ambulate around the house and complete ADLs  Educated the patient about the importance of preventing blood clots and provided measures how to prevent them   She reports being fatigued from the surgery yesterday and is mainly just relaxing at this time  Patient has not moved her bowels since the surgery  Encouraged patient to take an over the counter stool softener, if she is taking narcotic pain medication  Encouraged fiber intake and fluids  Reviewed incision care with the patient  Advised that after three days she may take a shower and gently wash the surgical site with soap and water  Use clean wash cloth, towels, and clothing  Do not submerge in water until cleared by the surgeon  Do not apply any creams, ointments, or lotions to the site  Patient is aware to call the office if any redness, swelling, drainage, dehiscence of incision, or fever >100 F occurs  Patient is aware to call the office if any concerns or questions may arise  Reminded patient of her upcoming appointments with the date/time/location  Patient was appreciative for the call           Past Medical History:   Diagnosis Date    Chronic pain disorder     CPAP (continuous positive airway pressure) dependence     Elevated serum hCG     chronic elevated levels    Fibromyalgia, primary     History of Chiari malformation     RLS (restless legs syndrome)     Sleep apnea        Past Surgical History:   Procedure Laterality Date    CARPAL TUNNEL RELEASE Bilateral     CRANIECTOMY SUBOCCIPITAL W/ CERVICAL LAMINECTOMY / CHIARI  07/2017    NE PERCUT IMPLNT NEUROELECT,EPIDURAL Right 2/23/2021    Procedure: INSERTION THORACIC DORSAL COLUMN SPINAL CORD STIMULATOR PERCUTANEOUS W IMPLANTABLE PULSE GENERATOR, RIGHT;  Surgeon: Peng Robbins MD;  Location:  MAIN OR;  Service: Neurosurgery    TONSILLECTOMY      and uvula    TUBAL LIGATION  1996       Current Outpatient Medications   Medication Sig Dispense Refill    ARIPiprazole (ABILIFY) 2 mg tablet Take by mouth daily       cephalexin (KEFLEX) 500 mg capsule Take 1 capsule (500 mg total) by mouth every 6 (six) hours for 3 days Start 2/23 after surgery 12 capsule 0    HYDROcodone-acetaminophen (NORCO) 5-325 mg per tablet Take 1 tablet by mouth 3 (three) times a day as needed      loratadine (Claritin) 10 mg tablet Take 10 mg by mouth as needed       metaxalone (SKELAXIN) 800 mg tablet Take 800 mg by mouth 2 (two) times a day      phentermine 37 5 MG capsule Take 37 5 mg by mouth every morning      pregabalin (LYRICA) 100 mg capsule Take 100 mg by mouth 3 (three) times a day      topiramate (TOPAMAX) 25 mg tablet Take 25 mg by mouth 2 (two) times a day      venlafaxine (EFFEXOR-XR) 150 mg 24 hr capsule TAKE 1 CAPSULE DAILY      zolpidem (AMBIEN) 5 mg tablet Take 5 mg by mouth daily at bedtime        No current facility-administered medications for this visit  Allergies   Allergen Reactions    Bupropion Headache     Migraine       Review of Systems    There were no vitals filed for this visit  VIRTUAL VISIT DISCLAIMER    Sylvia Coreas acknowledges that she has consented to an online visit or consultation  She understands that the online visit is based solely on information provided by her, and that, in the absence of a face-to-face physical evaluation by the physician, the diagnosis she receives is both limited and provisional in terms of accuracy and completeness  This is not intended to replace a full medical face-to-face evaluation by the physician  Sylvia Coreas understands and accepts these terms

## 2021-02-25 ENCOUNTER — TELEPHONE (OUTPATIENT)
Dept: NEUROSURGERY | Facility: CLINIC | Age: 51
End: 2021-02-25

## 2021-02-25 NOTE — TELEPHONE ENCOUNTER
Patient called and said her employer needed additional info question #4 FMLA forms  Addended, added name / description of surgery procedure performed 2/23, initialed, dated and resent  Patient will return call if she / they have further needs

## 2021-02-26 DIAGNOSIS — Z98.890 STATUS POST SURGERY: Primary | ICD-10-CM

## 2021-02-26 RX ORDER — HYDROCODONE BITARTRATE AND ACETAMINOPHEN 5; 325 MG/1; MG/1
1 TABLET ORAL EVERY 6 HOURS PRN
Qty: 20 TABLET | Refills: 0 | Status: SHIPPED | OUTPATIENT
Start: 2021-02-27 | End: 2021-03-04

## 2021-02-26 NOTE — TELEPHONE ENCOUNTER
Patient called [facility] regarding pain medication refill  Spoke with NURY PAZ regarding [place] CRNP recommendations in her note  NURY PAZ will sign for norco 5-325 mg every 6 hours for 5 days  Will place script now  Returned call to patient to update her on Rx refill and confirmed pharmacy

## 2021-03-09 ENCOUNTER — OFFICE VISIT (OUTPATIENT)
Dept: NEUROSURGERY | Facility: CLINIC | Age: 51
End: 2021-03-09

## 2021-03-09 VITALS
WEIGHT: 213 LBS | HEART RATE: 98 BPM | TEMPERATURE: 98.6 F | RESPIRATION RATE: 16 BRPM | DIASTOLIC BLOOD PRESSURE: 90 MMHG | BODY MASS INDEX: 39.2 KG/M2 | SYSTOLIC BLOOD PRESSURE: 129 MMHG | HEIGHT: 62 IN

## 2021-03-09 DIAGNOSIS — G89.4 CHRONIC PAIN SYNDROME: Primary | ICD-10-CM

## 2021-03-09 DIAGNOSIS — Z96.89 S/P INSERTION OF SPINAL CORD STIMULATOR: ICD-10-CM

## 2021-03-09 PROCEDURE — 99024 POSTOP FOLLOW-UP VISIT: CPT | Performed by: NURSE PRACTITIONER

## 2021-03-09 NOTE — ASSESSMENT & PLAN NOTE
· As addressed in HPI  · Aftercare 2 weeks status post surgery --for surgical incison assessment/care and SCS activation   · Assessed surgical incisions lumbar and right flank with 4 0 running monocryl suture skin closure, surgical glue overlying  Incisions are clean, dry and intact, without erythema, dehiscence, drainage or s/s of infection, and wound edges well approximated  Incisions are healing with scabbing along incision lines and adherent surgical glue  Cut frayed edges of surgical glue lumbar incision  Monocryl suture barely visible  · Refer to photo attachments of surgical incisions  · Surgica discomfort persist in generator site right flank, postoperative edema persist, explained will resolve over tiime , can take about 3 months for generator to settle into created pocket    · Continues with chronic pain as described inb HPI, spinal cord stimulator not activated  · Appropriate healing of generator site for SCS activation  · Met with [employer] Rep for SCS activation , programing adjustments, and teaching, pending attachment of session report  Plan;  · Instructions for continued care documented in AVS and reviewed in detail

## 2021-03-09 NOTE — PROGRESS NOTES
Assessment/Plan:    Chronic pain syndrome  · As addressed in HPI  · As addressed in status post insertion spinal cord stimulator     Plan;  · As addressed in status post spinal cord stimulator     S/P insertion of spinal cord stimulator  · As addressed in HPI  · Aftercare 2 weeks status post surgery --for surgical incison assessment/care and SCS activation   · Assessed surgical incisions lumbar and right flank with 4 0 running monocryl suture skin closure, surgical glue overlying  Incisions are clean, dry and intact, without erythema, dehiscence, drainage or s/s of infection, and wound edges well approximated  Incisions are healing with scabbing along incision lines and adherent surgical glue  Cut frayed edges of surgical glue lumbar incision  Monocryl suture barely visible  · Refer to photo attachments of surgical incisions  · Surgica discomfort persist in generator site right flank, postoperative edema persist, explained will resolve over tiime , can take about 3 months for generator to settle into created pocket    · Continues with chronic pain as described inb HPI, spinal cord stimulator not activated  · Appropriate healing of generator site for SCS activation  · Met with [employer] Rep for SCS activation , programing adjustments, and teaching, pending attachment of session report  Plan;  · Instructions for continued care documented in AVS and reviewed in detail   There are no diagnoses linked to this encounter  Subjective: 2 week postop visit after insertion of [employer] Spinal Cord Stimulator    Patient ID: Jonathan Reno is a 48 y o  female     HPI   Longstanding history of chronic pain in bilateral low back and lower extremities L>R, history of lumbar spondylosis and radiculopathy  Has neuropathic symptoms in fingers and toes of recent onset which she attributes to HO Chair malformation   There are documented symptoms of paresthesias in arms and legs,and balance difficultly Surgical intyervention s/p posterior fossa decompressionsuboccipital craniectomy, cervical laminectomy 8/2027     Failed conservative treatment of injections and therapy   She is managed by comprehensive pain, DR Francisco Javier Wang prescribing opiates  Status post trial [employer] SCS performed by Dr Francisco Javier Wang , achieving > 50 % pain relief and   She was referred to Dr Regan Andre, consulted with him 12/28/20after review and assessment was deemed and appropriate candidate to proceed with surgery   Surgery was scheduled with Dr Regan Andre for 2/23/21 : [address], RIGHT (Right Spine Thoracic)      Impressions and treatment recommendations were discussed in detail with the patient who verbalized understanding, all questions were answered and contact information was given in the event future questions arise  REVIEW OF SYSTEMS  Review of Systems   Musculoskeletal: Positive for back pain (occasionally radiates to legs ) and gait problem (occasional, 2/2 pain)  Skin: Positive for wound (surgical incisions)  Neurological: Positive for weakness (generalized post surgery)  Neuropathy in feet and fingers B/L   Psychiatric/Behavioral: Positive for dysphoric mood (controlled with meds) and sleep disturbance  All other systems reviewed and are negative          Meds/Allergies     Current Outpatient Medications   Medication Sig Dispense Refill    ARIPiprazole (ABILIFY) 2 mg tablet Take by mouth daily       HYDROcodone-acetaminophen (NORCO) 5-325 mg per tablet Take 1 tablet by mouth 3 (three) times a day as needed      loratadine (Claritin) 10 mg tablet Take 10 mg by mouth as needed       metaxalone (SKELAXIN) 800 mg tablet Take 800 mg by mouth 2 (two) times a day      pregabalin (LYRICA) 100 mg capsule Take 100 mg by mouth 3 (three) times a day      venlafaxine (EFFEXOR-XR) 150 mg 24 hr capsule TAKE 1 CAPSULE DAILY      zolpidem (AMBIEN) 5 mg tablet Take 5 mg by mouth daily at bedtime       phentermine 37 5 MG capsule Take 37 5 mg by mouth every morning      topiramate (TOPAMAX) 25 mg tablet Take 25 mg by mouth 2 (two) times a day       No current facility-administered medications for this visit  Allergies   Allergen Reactions    Bupropion Headache     Migraine       PAST HISTORY    Past Medical History:   Diagnosis Date    Chronic pain disorder     CPAP (continuous positive airway pressure) dependence     Elevated serum hCG     chronic elevated levels    Fibromyalgia, primary     History of Chiari malformation     RLS (restless legs syndrome)     Sleep apnea        Past Surgical History:   Procedure Laterality Date    CARPAL TUNNEL RELEASE Bilateral     CRANIECTOMY SUBOCCIPITAL W/ CERVICAL LAMINECTOMY / CHIARI  07/2017    RI PERCUT IMPLNT NEUROELECT,EPIDURAL Right 2/23/2021    Procedure: INSERTION THORACIC DORSAL COLUMN SPINAL CORD STIMULATOR PERCUTANEOUS W IMPLANTABLE PULSE GENERATOR, RIGHT;  Surgeon: Christine Patel MD;  Location: [facility] OR;  Service: Neurosurgery    TONSILLECTOMY      and uvula    TUBAL LIGATION  1996       Social History     Tobacco Use    Smoking status: Never Smoker    Smokeless tobacco: Never Used   Substance Use Topics    Alcohol use: Never     Frequency: Never    Drug use: Never       Family History   Problem Relation Age of Onset    Colon cancer Father    Maricruz Mulshahana HIV Brother     Spina bifida Son        The following portions of the patient's history were reviewed and updated as appropriate: allergies, current medications, past family history, past medical history, past social history, past surgical history and problem list       EXAM    Vitals:Blood pressure 129/90, pulse 98, temperature 98 6 °F (37 °C), temperature source Tympanic, resp  rate 16, height 5' 2" (1 575 m), weight 96 6 kg (213 lb)  ,Body mass index is 38 96 kg/m²  Physical Exam  Vitals signs reviewed   Exam conducted with a chaperone present ()  Constitutional:       Appearance: Normal appearance  She is obese  She is not ill-appearing, toxic-appearing or diaphoretic  Eyes:      General: No scleral icterus  Right eye: No discharge  Left eye: No discharge  Pulmonary:      Effort: Pulmonary effort is normal  No respiratory distress  Musculoskeletal:         General: No tenderness  Skin:     General: Skin is warm and dry  Neurological:      General: No focal deficit present  Mental Status: She is alert and oriented to person, place, and time  Gait: Gait is intact  Psychiatric:         Mood and Affect: Mood normal          Behavior: Behavior normal          Neurologic Exam     Mental Status   Oriented to person, place, and time  Level of consciousness: alert    Motor Exam     Strength   Right quadriceps: 5/5  Left quadriceps: 5/5  Left hamstrin/5  Right anterior tibial: 5/5  Left anterior tibial: 5/5  Right gastroc: 5/5  Left gastroc: 5/5    Gait, Coordination, and Reflexes     Gait  Gait: normal      Imaging Studies  Xr Spine Thoracic 3 Vw    Result Date: 2021  Narrative: C-arm - thoracic spine INDICATION: thoracic spinal cord stimulator insertion  Procedure guidance  COMPARISON:  None IMAGES:  1 FLUOROSCOPY TIME:   228 5 seconds TECHNIQUE: FINDINGS: Fluoroscopic guidance provided during  placement of thoracic dorsal column stimulator  Impression: Fluoroscopic guidance provided during  placement of thoracic dorsal column stimulator as above  Please see separate procedure note for details  Workstation performed: [id number]       I have personally reviewed pertinent reports     and I have personally reviewed pertinent films in PACS

## 2021-03-09 NOTE — ASSESSMENT & PLAN NOTE
· As addressed in HPI  · As addressed in status post insertion spinal cord stimulator     Plan;  · As addressed in status post spinal cord stimulator

## 2021-03-09 NOTE — PATIENT INSTRUCTIONS
Instructions for continued care at 2 weeks postop thru 6 weeks postop  · At 2 weeks postop can resume restricted medications such as ASA, products containing ASA, NSAID, fish oils, and OTC products or as previously directed  · Resume driving 2 week postoperatively  · Continue to observe incisions for redness, drainage, swelling dehiscence, increased pain, fever >/= 101, warmth to touch incision or skin surrounding, if occur call or report to office immediately for reassessment  · Explained monocryl reasonable sutures can take up to 90 days to reabsorb  · Do not remove glue adherent to incision lines covering scabs will eventually disappear  · Continue showers using clean towel and wash cloth with OTC antibacterial body wash, pat dry after showering continue protocol for an additional 2 weeks  · Do not apply lotions, creams, powder, or ointments to surgical incisions  · Activity as tolerated, no bending, lifting  greater than 10 lbs, turning, stretching, no pulling, pushing  ambulation as tolerated  · Refrain from strenuous activity, bending, and  twisting back  · No Immersion in water such as swimming, hot tub, or tub bath  · If  there is any significant change condition  call and/or return to the office immediately for reassessment  · Explained in detail barrier protection when charging generator, never place charging device or bowling directly on incision, provide barrier such as an item of thin clothing  Always provide clean storage for generator and bowling  · Directed to discuss with Rep protocol for cleaning generator bowling  · Met with product rep for device programing and teaching, refer to attached session report  · Explained chronic pain relief may not be immediate after reprogramming can take  Up to 72 hours to feel effect   · Contact Rep immediately if there is any change in efficacy or concern over SCS system     · Contact office if unable to reach Rep or if the reps intervention does  not resolve issue  ·

## 2021-04-03 ENCOUNTER — OCCMED (OUTPATIENT)
Dept: URGENT CARE | Facility: MEDICAL CENTER | Age: 51
End: 2021-04-03

## 2021-04-03 ENCOUNTER — APPOINTMENT (OUTPATIENT)
Dept: LAB | Facility: MEDICAL CENTER | Age: 51
End: 2021-04-03

## 2021-04-03 DIAGNOSIS — Z00.8 ENCOUNTER FOR BIOMETRIC SCREENING: Primary | ICD-10-CM

## 2021-04-03 DIAGNOSIS — Z00.8 ENCOUNTER FOR BIOMETRIC SCREENING: ICD-10-CM

## 2021-04-03 LAB
CHOLEST SERPL-MCNC: 199 MG/DL (ref 50–200)
EST. AVERAGE GLUCOSE BLD GHB EST-MCNC: 111 MG/DL
HBA1C MFR BLD: 5.5 %
HDLC SERPL-MCNC: 87 MG/DL
LDLC SERPL CALC-MCNC: 96 MG/DL (ref 0–100)
NONHDLC SERPL-MCNC: 112 MG/DL
TRIGL SERPL-MCNC: 78 MG/DL

## 2021-04-03 PROCEDURE — 36415 COLL VENOUS BLD VENIPUNCTURE: CPT

## 2021-04-03 PROCEDURE — 83036 HEMOGLOBIN GLYCOSYLATED A1C: CPT

## 2021-04-03 PROCEDURE — 80061 LIPID PANEL: CPT

## 2021-04-03 NOTE — PROGRESS NOTES
This encounter was created for [facility] orders only   [address] [facility]        NAME: Misti Millan is a 48 y o  female  : 1970    MRN: [id number]  DATE: April 3, 2021  TIME: 3:46 PM    There were no vitals taken for this visit  Assessment and Plan   Encounter for biometric screening [Z00 8]  1  Encounter for biometric screening  Hemoglobin A1C    Lipid panel         Patient Instructions       Follow up with PCP in 3-5 days  Proceed to  ER if symptoms worsen  Chief Complaint   No chief complaint on file          History of Present Illness       HPI    Review of Systems   Review of Systems      Current Medications       Current Outpatient Medications:     ARIPiprazole (ABILIFY) 2 mg tablet, Take by mouth daily , Disp: , Rfl:     HYDROcodone-acetaminophen (NORCO) 5-325 mg per tablet, Take 1 tablet by mouth 3 (three) times a day as needed, Disp: , Rfl:     loratadine (Claritin) 10 mg tablet, Take 10 mg by mouth as needed , Disp: , Rfl:     metaxalone (SKELAXIN) 800 mg tablet, Take 800 mg by mouth 2 (two) times a day, Disp: , Rfl:     phentermine 37 5 MG capsule, Take 37 5 mg by mouth every morning, Disp: , Rfl:     pregabalin (LYRICA) 100 mg capsule, Take 100 mg by mouth 3 (three) times a day, Disp: , Rfl:     topiramate (TOPAMAX) 25 mg tablet, Take 25 mg by mouth 2 (two) times a day, Disp: , Rfl:     venlafaxine (EFFEXOR-XR) 150 mg 24 hr capsule, TAKE 1 CAPSULE DAILY, Disp: , Rfl:     zolpidem (AMBIEN) 5 mg tablet, Take 5 mg by mouth daily at bedtime , Disp: , Rfl:     Current Allergies     Allergies as of 2021 - Reviewed 2021   Allergen Reaction Noted    Bupropion Headache 2018            The following portions of the patient's history were reviewed and updated as appropriate: allergies, current medications, past family history, past medical history, past social history, past surgical history and problem list      Past Medical History:   Diagnosis Date    Chronic pain disorder     CPAP (continuous positive airway pressure) dependence     Elevated serum hCG     chronic elevated levels    Fibromyalgia, primary     History of Chiari malformation     RLS (restless legs syndrome)     Sleep apnea        Past Surgical History:   Procedure Laterality Date    CARPAL TUNNEL RELEASE Bilateral     CRANIECTOMY SUBOCCIPITAL W/ CERVICAL LAMINECTOMY / CHIARI  07/2017    [place] PERCUT IMPLNT NEUROELECT,EPIDURAL Right 2/23/2021    Procedure: INSERTION THORACIC DORSAL COLUMN SPINAL CORD STIMULATOR PERCUTANEOUS W IMPLANTABLE PULSE GENERATOR, RIGHT;  Surgeon: Marcella Brush MD;  Location:  MAIN OR;  Service: Neurosurgery    TONSILLECTOMY      and uvula    TUBAL LIGATION  1996       Family History   Problem Relation Age of Onset    Colon cancer Father     HIV Brother     Spina bifida Son          Medications have been verified  Objective   There were no vitals taken for this visit         Physical Exam     Physical Exam

## 2021-04-05 ENCOUNTER — OFFICE VISIT (OUTPATIENT)
Dept: NEUROSURGERY | Facility: CLINIC | Age: 51
End: 2021-04-05

## 2021-04-05 VITALS
SYSTOLIC BLOOD PRESSURE: 142 MMHG | TEMPERATURE: 98.6 F | RESPIRATION RATE: 16 BRPM | HEART RATE: 118 BPM | WEIGHT: 213 LBS | BODY MASS INDEX: 39.2 KG/M2 | HEIGHT: 62 IN | DIASTOLIC BLOOD PRESSURE: 96 MMHG

## 2021-04-05 DIAGNOSIS — G89.4 CHRONIC PAIN SYNDROME: Primary | ICD-10-CM

## 2021-04-05 PROCEDURE — 99024 POSTOP FOLLOW-UP VISIT: CPT | Performed by: NEUROLOGICAL SURGERY

## 2021-08-06 ENCOUNTER — TELEPHONE (OUTPATIENT)
Dept: NEUROSURGERY | Facility: CLINIC | Age: 51
End: 2021-08-06

## 2021-08-06 NOTE — TELEPHONE ENCOUNTER
Left VM for pt to call back in regards to her  not working and needing a replacement  Tennille Quarles from [employer] stated she needs to call [employer] service at [phone] for a request of a new

## 2021-08-06 NOTE — TELEPHONE ENCOUNTER
Received another call on the nurse line from [facility] requesting a call back regarding replacement of her [employer] remote  She has already spoken to Dana who provided fax number for [employer] to send form needed to request this replacement  She was appreciative

## 2021-11-23 ENCOUNTER — OFFICE VISIT (OUTPATIENT)
Dept: URGENT CARE | Age: 51
End: 2021-11-23
Payer: COMMERCIAL

## 2021-11-23 VITALS
RESPIRATION RATE: 18 BRPM | HEIGHT: 62 IN | HEART RATE: 80 BPM | TEMPERATURE: 100.1 F | OXYGEN SATURATION: 100 % | BODY MASS INDEX: 39.2 KG/M2 | WEIGHT: 213 LBS

## 2021-11-23 DIAGNOSIS — B34.9 VIRAL SYNDROME: Primary | ICD-10-CM

## 2021-11-23 DIAGNOSIS — Z20.822 EXPOSURE TO 2019 NOVEL CORONAVIRUS: ICD-10-CM

## 2021-11-23 PROCEDURE — 99213 OFFICE O/P EST LOW 20 MIN: CPT | Performed by: PHYSICIAN ASSISTANT

## 2021-11-23 PROCEDURE — U0005 INFEC AGEN DETEC AMPLI PROBE: HCPCS | Performed by: PHYSICIAN ASSISTANT

## 2021-11-23 PROCEDURE — U0003 INFECTIOUS AGENT DETECTION BY NUCLEIC ACID (DNA OR RNA); SEVERE ACUTE RESPIRATORY SYNDROME CORONAVIRUS 2 (SARS-COV-2) (CORONAVIRUS DISEASE [COVID-19]), AMPLIFIED PROBE TECHNIQUE, MAKING USE OF HIGH THROUGHPUT TECHNOLOGIES AS DESCRIBED BY CMS-2020-01-R: HCPCS | Performed by: PHYSICIAN ASSISTANT

## 2021-11-24 LAB — SARS-COV-2 RNA RESP QL NAA+PROBE: POSITIVE

## 2022-12-13 ENCOUNTER — OFFICE VISIT (OUTPATIENT)
Dept: URGENT CARE | Facility: CLINIC | Age: 52
End: 2022-12-13

## 2022-12-13 VITALS
RESPIRATION RATE: 18 BRPM | OXYGEN SATURATION: 96 % | SYSTOLIC BLOOD PRESSURE: 151 MMHG | HEART RATE: 106 BPM | WEIGHT: 205 LBS | BODY MASS INDEX: 38.71 KG/M2 | HEIGHT: 61 IN | DIASTOLIC BLOOD PRESSURE: 85 MMHG | TEMPERATURE: 98.2 F

## 2022-12-13 DIAGNOSIS — J06.9 ACUTE URI: Primary | ICD-10-CM

## 2022-12-13 LAB
SARS-COV-2 AG UPPER RESP QL IA: POSITIVE
VALID CONTROL: ABNORMAL

## 2022-12-13 NOTE — PROGRESS NOTES
[facility]        NAME: Mali Macias is a 46 y o  female  : 1970    MRN: [id number]  DATE: 2022  TIME: 5:36 PM    Assessment and Plan   Acute URI [J06 9]  1  Acute URI  Poct Covid 19 Rapid Antigen Test        Rapid covid positive,    Patient Instructions     Patient Instructions   Patient instructed to self quarantine  Advised to avoid nsaids  If you develop prolonged high fever, worsening or productive cough, shortness of breath, difficulty breathing, chest pain, or any new or concerning symptoms please proceed ER  Instructed patient to call ER prior to arrival make them aware she is being test for covid  Patient verbalizes understanding  Start vitamin c 1g twice daily,vitamin d3 2000IU daily, and multivitamin  monitor pulse ox  Call PCP in 24 hours for f/u  Follow up with PCP in 3-5 days  Proceed to  ER if symptoms worsen  Chief Complaint     Chief Complaint   Patient presents with   • Cold Like Symptoms     Patient c/o cough, chest congestion, fever, and ear pain that started 2 days ago  History of Present Illness       URI   This is a new problem  Episode onset: 2 days ago  The problem has been gradually worsening  There has been no fever  Associated symptoms include congestion, coughing, ear pain and rhinorrhea  Pertinent negatives include no abdominal pain, chest pain, diarrhea, dysuria, headaches, nausea, neck pain, rash, sinus pain, sneezing, sore throat, swollen glands, vomiting or wheezing  She has tried acetaminophen for the symptoms  The treatment provided mild relief   sick with similar symptoms    Review of Systems   Review of Systems   Constitutional: Positive for fatigue and fever  Negative for chills and diaphoresis  HENT: Positive for congestion, ear pain, rhinorrhea and sinus pressure  Negative for facial swelling, postnasal drip, sinus pain, sneezing, sore throat, tinnitus and trouble swallowing  Eyes: Negative  Respiratory: Positive for cough  Negative for chest tightness, shortness of breath, wheezing and stridor  Cardiovascular: Negative for chest pain and palpitations  Gastrointestinal: Negative  Negative for abdominal pain, diarrhea, nausea and vomiting  Genitourinary: Negative for dysuria  Musculoskeletal: Negative for arthralgias, back pain, joint swelling, myalgias, neck pain and neck stiffness  Skin: Negative for rash  Neurological: Negative for dizziness, facial asymmetry, weakness, light-headedness, numbness and headaches           Current Medications       Current Outpatient Medications:   •  ARIPiprazole (ABILIFY) 2 mg tablet, Take by mouth daily , Disp: , Rfl:   •  HYDROcodone-acetaminophen (NORCO) 5-325 mg per tablet, Take 1 tablet by mouth 3 (three) times a day as needed, Disp: , Rfl:   •  metaxalone (SKELAXIN) 800 mg tablet, Take 800 mg by mouth 2 (two) times a day, Disp: , Rfl:   •  pregabalin (LYRICA) 100 mg capsule, Take 100 mg by mouth 3 (three) times a day, Disp: , Rfl:   •  topiramate (TOPAMAX) 25 mg tablet, Take 25 mg by mouth 2 (two) times a day, Disp: , Rfl:   •  venlafaxine (EFFEXOR-XR) 150 mg 24 hr capsule, TAKE 1 CAPSULE DAILY, Disp: , Rfl:   •  zolpidem (AMBIEN) 5 mg tablet, Take 5 mg by mouth daily at bedtime , Disp: , Rfl:   •  loratadine (CLARITIN) 10 mg tablet, Take 10 mg by mouth as needed  (Patient not taking: Reported on 12/13/2022), Disp: , Rfl:   •  phentermine 37 5 MG capsule, Take 37 5 mg by mouth every morning (Patient not taking: Reported on 12/13/2022), Disp: , Rfl:     Current Allergies     Allergies as of 12/13/2022 - Reviewed 12/13/2022   Allergen Reaction Noted   • Bupropion Headache 04/12/2018            The following portions of the patient's history were reviewed and updated as appropriate: allergies, current medications, past family history, past medical history, past social history, past surgical history and problem list      Past Medical History: Diagnosis Date   • Chronic pain disorder    • CPAP (continuous positive airway pressure) dependence    • Elevated serum hCG     chronic elevated levels   • Fibromyalgia, primary    • History of Chiari malformation    • RLS (restless legs syndrome)    • Sleep apnea        Past Surgical History:   Procedure Laterality Date   • CARPAL TUNNEL RELEASE Bilateral    • CRANIECTOMY SUBOCCIPITAL W/ CERVICAL LAMINECTOMY / CHIARI  07/2017   • [place] PERCUT IMPLNT NEUROELECT,EPIDURAL Right 2/23/2021    Procedure: INSERTION THORACIC DORSAL COLUMN SPINAL CORD STIMULATOR PERCUTANEOUS W IMPLANTABLE PULSE GENERATOR, RIGHT;  Surgeon: Mame Sainz MD;  Location:  MAIN OR;  Service: Neurosurgery   • TONSILLECTOMY      and uvula   • TUBAL LIGATION  1996       Family History   Problem Relation Age of Onset   • Colon cancer Father    • HIV Brother    • Spina bifida Son          Medications have been verified  Objective   /85   Pulse (!) 106   Temp 98 2 °F (36 8 °C) (Temporal)   Resp 18   Ht 5' 1" (1 549 m)   Wt 93 kg (205 lb)   SpO2 96%   BMI 38 73 kg/m²   No LMP recorded  Patient is postmenopausal        Physical Exam     Physical Exam  Constitutional:       General: She is not in acute distress  Appearance: She is well-developed  She is not diaphoretic  HENT:      Head: Normocephalic and atraumatic  Right Ear: Hearing, tympanic membrane, ear canal and external ear normal       Left Ear: Hearing, tympanic membrane, ear canal and external ear normal       Nose: Congestion and rhinorrhea present  No mucosal edema  Right Sinus: No maxillary sinus tenderness or frontal sinus tenderness  Left Sinus: No maxillary sinus tenderness  Mouth/Throat:      Pharynx: Oropharynx is clear  Uvula midline  Cardiovascular:      Rate and Rhythm: Normal rate and regular rhythm        Heart sounds: Normal heart sounds, S1 normal and S2 normal    Pulmonary:      Effort: Pulmonary effort is normal       Breath sounds: Normal breath sounds and air entry  Lymphadenopathy:      Cervical: No cervical adenopathy  Skin:     General: Skin is warm and dry  Capillary Refill: Capillary refill takes less than 2 seconds  Neurological:      Mental Status: She is alert and oriented to person, place, and time

## 2023-01-19 ENCOUNTER — OFFICE VISIT (OUTPATIENT)
Dept: URGENT CARE | Facility: CLINIC | Age: 53
End: 2023-01-19

## 2023-01-19 VITALS
RESPIRATION RATE: 18 BRPM | WEIGHT: 221 LBS | TEMPERATURE: 98.2 F | DIASTOLIC BLOOD PRESSURE: 74 MMHG | BODY MASS INDEX: 41.72 KG/M2 | HEART RATE: 107 BPM | HEIGHT: 61 IN | OXYGEN SATURATION: 96 % | SYSTOLIC BLOOD PRESSURE: 130 MMHG

## 2023-01-19 DIAGNOSIS — R05.1 ACUTE COUGH: Primary | ICD-10-CM

## 2023-01-19 RX ORDER — PREDNISONE 20 MG/1
20 TABLET ORAL 2 TIMES DAILY WITH MEALS
Qty: 10 TABLET | Refills: 0 | Status: SHIPPED | OUTPATIENT
Start: 2023-01-19 | End: 2023-01-24

## 2023-01-19 RX ORDER — BENZONATATE 100 MG/1
100 CAPSULE ORAL 3 TIMES DAILY PRN
Qty: 20 CAPSULE | Refills: 0 | Status: SHIPPED | OUTPATIENT
Start: 2023-01-19

## 2023-01-19 NOTE — PROGRESS NOTES
[facility]        NAME: Irvin Flowers is a 46 y o  female  : 1970    MRN: [id number]  DATE: 2023  TIME: 11:51 AM    Assessment and Plan   Acute cough [R05 1]  1  Acute cough  predniSONE 20 mg tablet    benzonatate (TESSALON PERLES) 100 mg capsule            Patient Instructions     Patient Instructions   Take medication as directed  Rest and drink plenty of fluids  A cool mist humidifier can be helpful  If you develop a worsening cough, chest pain, shortness of breath, palpitations, coughing up blood, prolonged high fever, any new or concerning symptoms please return or proceed ER  Recommend following up with PCP in 3-5 days        Chief Complaint     Chief Complaint   Patient presents with   • Cough     Started 3 days ago  Worse at night  Non productive hacking cough  History of Present Illness       Cough  This is a new problem  Episode onset: 3 days ago  The problem has been unchanged  The problem occurs every few minutes  The cough is non-productive  Associated symptoms include nasal congestion  Pertinent negatives include no chest pain, chills, ear pain, fever, headaches, hemoptysis, myalgias, postnasal drip, rhinorrhea, sore throat, shortness of breath or wheezing  Nothing aggravates the symptoms  She has tried nothing for the symptoms  The treatment provided no relief  There is no history of asthma or bronchitis  Had covid 1 month ago  Review of Systems   Review of Systems   Constitutional: Negative for chills, diaphoresis, fatigue and fever  HENT: Positive for congestion  Negative for ear pain, facial swelling, postnasal drip, rhinorrhea, sinus pressure, sinus pain, sore throat, tinnitus and trouble swallowing  Eyes: Negative  Respiratory: Positive for cough  Negative for hemoptysis, chest tightness, shortness of breath, wheezing and stridor  Cardiovascular: Negative for chest pain and palpitations  Gastrointestinal: Negative      Musculoskeletal: Negative for arthralgias, back pain, joint swelling, myalgias, neck pain and neck stiffness  Neurological: Negative for dizziness, facial asymmetry, weakness, light-headedness, numbness and headaches           Current Medications       Current Outpatient Medications:   •  ARIPiprazole (ABILIFY) 2 mg tablet, Take by mouth daily , Disp: , Rfl:   •  benzonatate (TESSALON PERLES) 100 mg capsule, Take 1 capsule (100 mg total) by mouth 3 (three) times a day as needed for cough, Disp: 20 capsule, Rfl: 0  •  HYDROcodone-acetaminophen (NORCO) 5-325 mg per tablet, Take 1 tablet by mouth 3 (three) times a day as needed, Disp: , Rfl:   •  metaxalone (SKELAXIN) 800 mg tablet, Take 800 mg by mouth 2 (two) times a day, Disp: , Rfl:   •  predniSONE 20 mg tablet, Take 1 tablet (20 mg total) by mouth 2 (two) times a day with meals for 5 days, Disp: 10 tablet, Rfl: 0  •  pregabalin (LYRICA) 100 mg capsule, Take 100 mg by mouth 3 (three) times a day, Disp: , Rfl:   •  venlafaxine (EFFEXOR-XR) 150 mg 24 hr capsule, TAKE 1 CAPSULE DAILY, Disp: , Rfl:   •  zolpidem (AMBIEN) 5 mg tablet, Take 5 mg by mouth daily at bedtime , Disp: , Rfl:   •  loratadine (CLARITIN) 10 mg tablet, Take 10 mg by mouth as needed  (Patient not taking: Reported on 12/13/2022), Disp: , Rfl:   •  phentermine 37 5 MG capsule, Take 37 5 mg by mouth every morning (Patient not taking: Reported on 12/13/2022), Disp: , Rfl:   •  topiramate (TOPAMAX) 25 mg tablet, Take 25 mg by mouth 2 (two) times a day (Patient not taking: Reported on 1/19/2023), Disp: , Rfl:     Current Allergies     Allergies as of 01/19/2023 - Reviewed 01/19/2023   Allergen Reaction Noted   • Bupropion Headache 04/12/2018            The following portions of the patient's history were reviewed and updated as appropriate: allergies, current medications, past family history, past medical history, past social history, past surgical history and problem list      Past Medical History:   Diagnosis Date   • Chronic pain disorder    • CPAP (continuous positive airway pressure) dependence    • Elevated serum hCG     chronic elevated levels   • Fibromyalgia, primary    • History of Chiari malformation    • RLS (restless legs syndrome)    • Sleep apnea        Past Surgical History:   Procedure Laterality Date   • CARPAL TUNNEL RELEASE Bilateral    • CRANIECTOMY SUBOCCIPITAL W/ CERVICAL LAMINECTOMY / CHIARI  07/2017   • [place] PRQ IMPLTJ NSTIM ELECTRODE ARRAY EPIDURAL Right 2/23/2021    Procedure: INSERTION THORACIC DORSAL COLUMN SPINAL CORD STIMULATOR PERCUTANEOUS W IMPLANTABLE PULSE GENERATOR, RIGHT;  Surgeon: Martin Coleman MD;  Location:  MAIN OR;  Service: Neurosurgery   • TONSILLECTOMY      and uvula   • TUBAL LIGATION  1996       Family History   Problem Relation Age of Onset   • Colon cancer Father    • HIV Brother    • Spina bifida Son          Medications have been verified  Objective   /74   Pulse (!) 107   Temp 98 2 °F (36 8 °C)   Resp 18   Ht 5' 1" (1 549 m)   Wt 100 kg (221 lb)   SpO2 96%   BMI 41 76 kg/m²   No LMP recorded  Patient is postmenopausal        Physical Exam     Physical Exam  Constitutional:       General: She is not in acute distress  Appearance: She is well-developed  She is not diaphoretic  HENT:      Head: Normocephalic and atraumatic  Right Ear: Hearing, tympanic membrane, ear canal and external ear normal       Left Ear: Hearing, tympanic membrane, ear canal and external ear normal       Nose: Congestion present  No mucosal edema or rhinorrhea  Right Sinus: No maxillary sinus tenderness or frontal sinus tenderness  Left Sinus: No maxillary sinus tenderness or frontal sinus tenderness  Mouth/Throat:      Pharynx: Oropharynx is clear  Uvula midline  Cardiovascular:      Rate and Rhythm: Normal rate and regular rhythm        Heart sounds: Normal heart sounds, S1 normal and S2 normal    Pulmonary:      Effort: Pulmonary effort is normal       Breath sounds: Normal breath sounds and air entry  Lymphadenopathy:      Cervical: No cervical adenopathy  Skin:     General: Skin is warm and dry  Capillary Refill: Capillary refill takes less than 2 seconds  Neurological:      Mental Status: She is alert and oriented to person, place, and time

## 2023-11-06 NOTE — PROGRESS NOTES
PT Evaluation     Today's date: 2023  Patient name: Kyleigh Gee  : 1970  MRN: [id number]  Referring provider: Yanet Manning PA-C  Dx:   Encounter Diagnosis     ICD-10-CM    1. Lumbar radiculopathy  M54.16       2. Lumbosacral radiculopathy  M54.17                      Assessment  Assessment details: Kyleigh Gee is a 48 y.o. female with a history of chronic pain, CPAP, fibromyalgia, Chiari malformation, RLS, BMI>30, and sleep apnea that presents for a high complexity physical therapy initial evaluation. The patient demonstrates signs and symptoms consistent with Lumbar / Lumbosacral Radiculopathy. During the examination the patient demonstrated decreased core/LE strength, decreased Lumbar ROM, activity intolerance, and lumbar pain. The patient's impairments are causing the following functional limitations: difficulty with prolonged standing, prolonged walking, difficulty bending/stooping, difficulty lifting/carrying objects, walking on unlevel surfaces, difficulty squatting/kneeling, difficulty stair-climbing, and difficulty transferring from low surfaces. The patient's clinical presentation is unstable due to a number of participation restrictions, significant medial history, and functional limitation (FOTO 63% function). The patient will benefit from skilled PT services to address impairments, work towards goals, and restore PLOF. Impairments: abnormal or restricted ROM, activity intolerance, impaired balance, impaired physical strength, lacks appropriate home exercise program, pain with function and poor posture   Functional limitations: difficulty with prolonged standing, prolonged walking, difficulty bending/stooping, difficulty lifting/carrying objects, walking on unlevel surfaces, difficulty squatting/kneeling, difficulty stair-climbing, and difficulty transferring from low surfaces.   Symptom irritability: highUnderstanding of Dx/Px/POC: good   Prognosis: good  Prognosis details: Positive prognostic indicators include: positive attitude toward recovery, good understanding of diagnosis/treatment plan, and absence of observed red flags. Negative prognostic indicators include: chronicity of symptoms, and minimal changes in pain with movement. Goals  STG: Achieve in 4-6 weeks  1. Decrease lumbar pain at worst by 50% to improve activity tolerance for self/care and leisure activities. 2.  Improve core/LE strength by 1/2-1 muscle grade to improve ability to change body positions without difficulty. 3.  Improve Lumbar ROM to " minimal restriction" to facilitate normal movement patterns for ADL's/work tasks. LTG: Achieve in 8-12 weeks  1. Patient's FOTO score improve to > 70% to indicate a return to normal functioning. 2.  Patient tolerate reduce pain, position better, and gain strength to achieve patient specific goal.  3.  Patient achieve independence with performing home exercise plan. Plan  Plan details: RE-ASSESS 1X/MONTH -  PATIENT REQUESTING 1X/WEEK  Patient would benefit from: skilled physical therapy  Planned modality interventions: cryotherapy, thermotherapy: hydrocollator packs and traction  Planned therapy interventions: joint mobilization, manual therapy, neuromuscular re-education, patient education, postural training, self care, strengthening, stretching, therapeutic activities, therapeutic exercise, home exercise program, abdominal trunk stabilization, balance, body mechanics training and IASTM  Frequency: 1-3x/wk. Duration in weeks: 12  Plan of Care beginning date: 11/8/2023  Plan of Care expiration date: 2/7/2024  Treatment plan discussed with: PTA and patient      Subjective Evaluation    History of Present Illness  Mechanism of injury: Domonique Murray is a 48 y.o. female that presents to outpatient physical therapy with complaints of chronic low back less than 10 years. The patient denies any GARY for onset of LBP.   The patient reports having a neuro stimulator at her mid back. The patient gets relief from moist heat. The patient notes her pain does travel into her hips - diffiuclty with lying on her B/L hips. The patient notes she needs to do PT to get an MRI. The patient notes difficulty with ambulating up/down steps - 1 step at a time. The patient's main goal for physical therapy is to decrease her pain levels and position better. Recurrent probem    Patient Goals  Patient goals for therapy: decreased pain, increased motion, increased strength, independence with ADLs/IADLs and return to sport/leisure activities  Patient goal: improve positioning / reduce pain / improve strength  Pain  Current pain rating: 3  At best pain ratin  At worst pain ratin  Location: R low back, R buttocks, R lateral hip/thigh  Quality: dull ache  Relieving factors: heat  Aggravating factors: stair climbing, standing, walking and sitting  Progression: no change    Social Support  Steps to enter house: yes  Stairs in house: yes   Lives in: multiple-level home  Lives with: spouse    Employment status: working ( [employer])  Treatments  Previous treatment: injection treatment (thoracic neuro stimulator - upcoming injection SIJ area)  Current treatment: physical therapy      Objective     Concurrent Complaints  Positive for disturbed sleep.  Negative for night pain, bladder dysfunction, bowel dysfunction, saddle (S4) numbness, history of cancer, history of trauma and infection    Postural Observations  Seated posture: fair  Standing posture: fair  Correction of posture: has no consistent effect    Additional Postural Observation Details  L lateral shift    Neurological Testing     Sensation     Lumbar   Left   Intact: light touch    Right   Intact: light touch    Reflexes   Left   Patellar (L4): normal (2+)  Achilles (S1): normal (2+)  Babinski sign: negative    Right   Patellar (L4): normal (2+)  Achilles (S1): normal (2+)  Babinski sign: negative    Active Range of Motion     Lumbar   Flexion:  with pain Restriction level: minimal  Extension:  Restriction level: moderate  Left lateral flexion:  with pain Restriction level: minimal  Right lateral flexion:  Restriction level: moderate  Left rotation:  Restriction level: moderate  Right rotation:  Restriction level: moderate  Mechanical Assessment    Cervical      Thoracic      Lumbar    Standing extension: repeated movements  Lying extension: repeated movements    Strength/Myotome Testing     Left Hip   Planes of Motion   Flexion: 4  Abduction: 4-  Adduction: 4+    Right Hip   Planes of Motion   Flexion: 4  Abduction: 4-  Adduction: 4+    Left Knee   Flexion: 4  Extension: 5    Right Knee   Flexion: 4  Extension: 4-    Left Ankle/Foot   Dorsiflexion: 5  Plantar flexion: 5  Great toe extension: 5    Right Ankle/Foot   Dorsiflexion: 5  Plantar flexion: 5  Great toe extension: 5    Muscle Activation     Additional Muscle Activation Details  Decreased TrA, multifidus, gluteal activation with transfers / position changes / dynamic movement      Tests     Lumbar   Positive SIJ compression. Negative sacral spring . Left   Negative slump test.     Right   Positive slump test.     Left Pelvic Girdle/Sacrum   Positive: active SLR test.     Right Pelvic Girdle/Sacrum   Negative: active SLR test.     Left Hip   Positive FADIR. Right Hip   Negative FADIR. Additional Tests Details  FOTO: 63% ( predicted 62%)             Precautions: Hx chiari malformation; Fibromyalgia;  Thoracic spinal cord stimulator; L LAT shift  RE-EVAL:12/6  Access Code [facility]   Specialty Daily Treatment Diary     Manual  11/8       STM lumbar paraspinals        Supine to sit - LLD TEST NV       Hamstring stretch B/L        Piriformis Stretch B/L        Prone Quad stretch            Therapeutic Exercise 11/8       Treadmill Ambulation for endurance        UBE Stand ALT FWD/BACK for core/posture strength Bridges        Self 90-90 hamstring stretch B/L        Prone press ups 2x5       Standing back extensions x10               LTR cross legs                Hip EXT SLR                Neuro Re-ed        Core brace        kegels        Multifidi        Brace with knee fallouts        Quad DLS UE  LE  UE+LE        Quad knee lifts        Clam shells        MTP/LTP/ Anti-rotations        Towel roll education, Posture correction; movement precautions DONE AD       Lift/chop T-band        SLB        Posture corrections seated                Sidestepping        Heel toe Amb                        Therapeutic Activity        Steps ups fwd/side        Crate carry        Crate lifts 3 levels        Lunges        Chair squats            Modalities        MHP/CP to L/B                               The patient was given a new home exercise plan with instruction, pictures, and verbal feedback. The patient accepts and understands the new home activities.

## 2023-11-08 ENCOUNTER — EVALUATION (OUTPATIENT)
Dept: PHYSICAL THERAPY | Facility: CLINIC | Age: 53
End: 2023-11-08
Payer: COMMERCIAL

## 2023-11-08 ENCOUNTER — TELEPHONE (OUTPATIENT)
Dept: PHYSICAL THERAPY | Facility: CLINIC | Age: 53
End: 2023-11-08

## 2023-11-08 DIAGNOSIS — M54.17 LUMBOSACRAL RADICULOPATHY: ICD-10-CM

## 2023-11-08 DIAGNOSIS — M54.16 LUMBAR RADICULOPATHY: Primary | ICD-10-CM

## 2023-11-08 PROCEDURE — 97112 NEUROMUSCULAR REEDUCATION: CPT | Performed by: PHYSICAL THERAPIST

## 2023-11-08 PROCEDURE — 97162 PT EVAL MOD COMPLEX 30 MIN: CPT | Performed by: PHYSICAL THERAPIST

## 2023-11-08 PROCEDURE — 97110 THERAPEUTIC EXERCISES: CPT | Performed by: PHYSICAL THERAPIST

## 2023-11-08 NOTE — LETTER
2023    Mary Beth, [address] [place] [id number]    Patient: Gilberto Pool   YOB: 1970   Date of Visit: 2023     Encounter Diagnosis     ICD-10-CM    1. Lumbar radiculopathy  M54.16       2. Lumbosacral radiculopathy  M54.17           Dear Dr. Tadeo Jackson: Thank you for your recent referral of Gilberto Pool. Please review the attached evaluation summary from Tootie's recent visit. Please verify that you agree with the plan of care by signing the attached order. If you have any questions or concerns, please do not hesitate to call. I sincerely appreciate the opportunity to share in the care of one of your patients and hope to have another opportunity to work with you in the near future. Sincerely,    Nichole Araujo, PT      Referring Provider:      I certify that I have read the below Plan of Care and certify the need for these services furnished under this plan of treatment while under my care. Mary Beth PA-C  [address] [place] [address]  Via Fax: [phone]          PT Evaluation     Today's date: 2023  Patient name: Gilberto Pool  : 1970  MRN: [id number]  Referring provider: Mary Beth PA-C  Dx:   Encounter Diagnosis     ICD-10-CM    1. Lumbar radiculopathy  M54.16       2. Lumbosacral radiculopathy  M54.17                      Assessment  Assessment details: Gilberto Pool is a 48 y.o. female with a history of chronic pain, CPAP, fibromyalgia, Chiari malformation, RLS, BMI>30, and sleep apnea that presents for a high complexity physical therapy initial evaluation. The patient demonstrates signs and symptoms consistent with Lumbar / Lumbosacral Radiculopathy. During the examination the patient demonstrated decreased core/LE strength, decreased Lumbar ROM, activity intolerance, and lumbar pain.   The patient's impairments are causing the following functional limitations: difficulty with prolonged standing, prolonged walking, difficulty bending/stooping, difficulty lifting/carrying objects, walking on unlevel surfaces, difficulty squatting/kneeling, difficulty stair-climbing, and difficulty transferring from low surfaces. The patient's clinical presentation is unstable due to a number of participation restrictions, significant medial history, and functional limitation (FOTO 63% function). The patient will benefit from skilled PT services to address impairments, work towards goals, and restore PLOF. Impairments: abnormal or restricted ROM, activity intolerance, impaired balance, impaired physical strength, lacks appropriate home exercise program, pain with function and poor posture   Functional limitations: difficulty with prolonged standing, prolonged walking, difficulty bending/stooping, difficulty lifting/carrying objects, walking on unlevel surfaces, difficulty squatting/kneeling, difficulty stair-climbing, and difficulty transferring from low surfaces. Symptom irritability: highUnderstanding of Dx/Px/POC: good   Prognosis: good  Prognosis details: Positive prognostic indicators include: positive attitude toward recovery, good understanding of diagnosis/treatment plan, and absence of observed red flags. Negative prognostic indicators include: chronicity of symptoms, and minimal changes in pain with movement. Goals  STG: Achieve in 4-6 weeks  1. Decrease lumbar pain at worst by 50% to improve activity tolerance for self/care and leisure activities. 2.  Improve core/LE strength by 1/2-1 muscle grade to improve ability to change body positions without difficulty. 3.  Improve Lumbar ROM to " minimal restriction" to facilitate normal movement patterns for ADL's/work tasks. LTG: Achieve in 8-12 weeks  1. Patient's FOTO score improve to > 70% to indicate a return to normal functioning.   2.  Patient tolerate reduce pain, position better, and gain strength to achieve patient specific goal.  3.  Patient achieve independence with performing home exercise plan. Plan  Plan details: RE-ASSESS 1X/MONTH -  PATIENT REQUESTING 1X/WEEK  Patient would benefit from: skilled physical therapy  Planned modality interventions: cryotherapy, thermotherapy: hydrocollator packs and traction  Planned therapy interventions: joint mobilization, manual therapy, neuromuscular re-education, patient education, postural training, self care, strengthening, stretching, therapeutic activities, therapeutic exercise, home exercise program, abdominal trunk stabilization, balance, body mechanics training and IASTM  Frequency: 1-3x/wk. Duration in weeks: 12  Plan of Care beginning date: 2023  Plan of Care expiration date: 2024  Treatment plan discussed with: PTA and patient      Subjective Evaluation    History of Present Illness  Mechanism of injury: Wilder Almaraz is a 48 y.o. female that presents to outpatient physical therapy with complaints of chronic low back less than 10 years. The patient denies any GARY for onset of LBP. The patient reports having a neuro stimulator at her mid back. The patient gets relief from moist heat. The patient notes her pain does travel into her hips - diffiuclty with lying on her B/L hips. The patient notes she needs to do PT to get an MRI. The patient notes difficulty with ambulating up/down steps - 1 step at a time. The patient's main goal for physical therapy is to decrease her pain levels and position better.              Recurrent probem    Patient Goals  Patient goals for therapy: decreased pain, increased motion, increased strength, independence with ADLs/IADLs and return to sport/leisure activities  Patient goal: improve positioning / reduce pain / improve strength  Pain  Current pain rating: 3  At best pain ratin  At worst pain ratin  Location: R low back, R buttocks, R lateral hip/thigh  Quality: dull ache  Relieving factors: heat  Aggravating factors: stair climbing, standing, walking and sitting  Progression: no change    Social Support  Steps to enter house: yes  Stairs in house: yes   Lives in: multiple-level home  Lives with: spouse    Employment status: working ( [employer])  Treatments  Previous treatment: injection treatment (thoracic neuro stimulator - upcoming injection SIJ area)  Current treatment: physical therapy      Objective     Concurrent Complaints  Positive for disturbed sleep.  Negative for night pain, bladder dysfunction, bowel dysfunction, saddle (S4) numbness, history of cancer, history of trauma and infection    Postural Observations  Seated posture: fair  Standing posture: fair  Correction of posture: has no consistent effect    Additional Postural Observation Details  L lateral shift    Neurological Testing     Sensation     Lumbar   Left   Intact: light touch    Right   Intact: light touch    Reflexes   Left   Patellar (L4): normal (2+)  Achilles (S1): normal (2+)  Babinski sign: negative    Right   Patellar (L4): normal (2+)  Achilles (S1): normal (2+)  Babinski sign: negative    Active Range of Motion     Lumbar   Flexion:  with pain Restriction level: minimal  Extension:  Restriction level: moderate  Left lateral flexion:  with pain Restriction level: minimal  Right lateral flexion:  Restriction level: moderate  Left rotation:  Restriction level: moderate  Right rotation:  Restriction level: moderate  Mechanical Assessment    Cervical      Thoracic      Lumbar    Standing extension: repeated movements  Lying extension: repeated movements    Strength/Myotome Testing     Left Hip   Planes of Motion   Flexion: 4  Abduction: 4-  Adduction: 4+    Right Hip   Planes of Motion   Flexion: 4  Abduction: 4-  Adduction: 4+    Left Knee   Flexion: 4  Extension: 5    Right Knee   Flexion: 4  Extension: 4-    Left Ankle/Foot   Dorsiflexion: 5  Plantar flexion: 5  Great toe extension: 5    Right Ankle/Foot Dorsiflexion: 5  Plantar flexion: 5  Great toe extension: 5    Muscle Activation     Additional Muscle Activation Details  Decreased TrA, multifidus, gluteal activation with transfers / position changes / dynamic movement      Tests     Lumbar   Positive SIJ compression. Negative sacral spring . Left   Negative slump test.     Right   Positive slump test.     Left Pelvic Girdle/Sacrum   Positive: active SLR test.     Right Pelvic Girdle/Sacrum   Negative: active SLR test.     Left Hip   Positive FADIR. Right Hip   Negative FADIR. Additional Tests Details  FOTO: 63% ( predicted 62%)             Precautions: Hx chiari malformation; Fibromyalgia;  Thoracic spinal cord stimulator; L LAT shift  RE-EVAL:12/6  Access Code [facility]   Specialty Daily Treatment Diary     Manual  11/8       STM lumbar paraspinals        Supine to sit - LLD TEST NV       Hamstring stretch B/L        Piriformis Stretch B/L        Prone Quad stretch            Therapeutic Exercise 11/8       Treadmill Ambulation for endurance        UBE Stand ALT FWD/BACK for core/posture strength                                Bridges        Self 90-90 hamstring stretch B/L        Prone press ups 2x5       Standing back extensions x10               LTR cross legs                Hip EXT SLR                Neuro Re-ed        Core brace        kegels        Multifidi        Brace with knee fallouts        Quad DLS UE  LE  UE+LE        Quad knee lifts        Clam shells        MTP/LTP/ Anti-rotations        Towel roll education, Posture correction; movement precautions DONE AD       Lift/chop T-band        SLB        Posture corrections seated                Sidestepping        Heel toe Amb                        Therapeutic Activity        Steps ups fwd/side        Crate carry        Crate lifts 3 levels        Lunges        Chair squats            Modalities        MHP/CP to L/B                               The patient was given a new home exercise plan with instruction, pictures, and verbal feedback. The patient accepts and understands the new home activities.

## 2023-11-15 ENCOUNTER — OFFICE VISIT (OUTPATIENT)
Dept: PHYSICAL THERAPY | Facility: CLINIC | Age: 53
End: 2023-11-15
Payer: COMMERCIAL

## 2023-11-15 DIAGNOSIS — M54.16 LUMBAR RADICULOPATHY: Primary | ICD-10-CM

## 2023-11-15 DIAGNOSIS — M54.17 LUMBOSACRAL RADICULOPATHY: ICD-10-CM

## 2023-11-15 PROCEDURE — 97112 NEUROMUSCULAR REEDUCATION: CPT | Performed by: PHYSICAL THERAPIST

## 2023-11-15 PROCEDURE — 97110 THERAPEUTIC EXERCISES: CPT | Performed by: PHYSICAL THERAPIST

## 2023-11-15 NOTE — PROGRESS NOTES
Daily Note     Today's date: 11/15/2023  Patient name: Jackie Galvez  : 1970  MRN: [id number]  Referring provider: Latrice Perez PA-C  Dx:   Encounter Diagnosis     ICD-10-CM    1. Lumbar radiculopathy  M54.16       2. Lumbosacral radiculopathy  M54.17                      Subjective: The patient reports feeling 3/10 pain at her R lumbar spine/buttocks      Objective: See treatment diary below      Assessment: The patient had a LLD with L LE short to even with supine to sit testing. We utilized MET with posterior rotation L LE and ANT rotation R LE which corrected the LLD. The patient notes a reduction in symptoms and we then focused on pelvic stability / core stability. The patient's HEP was updated and the patient understands. The patient will benefit from continued PT to achieve her goals of therapy. Plan: Continue per plan of care. Progress treatment as tolerated. Precautions: Hx chiari malformation; Fibromyalgia; Thoracic spinal cord stimulator; L LAT shift  RE-EVAL:  Access Code [facility]   Specialty Daily Treatment Diary     Manual  11/8 11/15      STM lumbar paraspinals        Supine to sit - LLD TEST NV L LE short to even      Hamstring stretch B/L        Piriformis Stretch B/L        Prone Quad stretch            Therapeutic Exercise 11/8 11/15      Treadmill Ambulation for endurance  1. 1mph - 1. 4mph  X 4 mins      UBE Stand ALT FWD/BACK for core/posture strength  100/70 x 4 mins ALT              MET L LE Post push R LE ANT push  Improved LLD - equal afterwards with sup->sit testing      Ball squeeze  x5      Bridges  *2x5 - add NV to HEP      Self 90-90 hamstring stretch B/L        Prone press ups 2x5 2x5      Standing back extensions x10 x10      Supine hip ABD BAND  *                      Stand hip EXT+ABD  *              Neuro Re-ed        Core brace  x10      kegels  x10      Multifidi  *      Brace with knee fallouts        Quad DLS UE  LE  UE+LE  *      Quad knee lifts        Clam shells  X10 B/L      MTP/LTP/ Anti-rotations  *      Towel roll education, Posture correction; movement precautions DONE AD               SLB  Level hips mirror      Posture corrections seated        Fwd/back band walk   *     Sidestepping band   *     Heel toe Amb   *                     Therapeutic Activity        Steps ups fwd/side        Crate carry        Crate lifts 3 levels        Lunges        Chair squats            Modalities        MHP/CP to L/B

## 2023-11-22 ENCOUNTER — OFFICE VISIT (OUTPATIENT)
Dept: PHYSICAL THERAPY | Facility: CLINIC | Age: 53
End: 2023-11-22
Payer: COMMERCIAL

## 2023-11-22 DIAGNOSIS — M54.17 LUMBOSACRAL RADICULOPATHY: ICD-10-CM

## 2023-11-22 DIAGNOSIS — M54.16 LUMBAR RADICULOPATHY: Primary | ICD-10-CM

## 2023-11-22 PROCEDURE — 97112 NEUROMUSCULAR REEDUCATION: CPT

## 2023-11-22 PROCEDURE — 97110 THERAPEUTIC EXERCISES: CPT

## 2023-11-22 PROCEDURE — 97140 MANUAL THERAPY 1/> REGIONS: CPT

## 2023-11-22 NOTE — PROGRESS NOTES
Daily Note     Today's date: 2023  Patient name: Farzana Wang  : 1970  MRN: [id number]  Referring provider: Arianna Armenta PA-C  Dx:   Encounter Diagnosis     ICD-10-CM    1. Lumbar radiculopathy  M54.16       2. Lumbosacral radiculopathy  M54.17           Start Time: 09  Stop Time: 946  Total time in clinic (min): 44 minutes    Subjective: Pt reports she is doing so so today. Objective: See treatment diary below      Assessment: Tolerated treatment well. Progressed pt with ex's/increased reps as indicated below. Pt noted feeling a bit more sore after ex's completed. Initiated manual gentle hamstring/piriformis stretching, which pt did note having some relief afterwards by end of session. Instructed pt to do PPU and standing backward bends for HEP. Patient demonstrated fatigue post treatment, exhibited good technique with therapeutic exercises, and would benefit from continued PT. Plan: Continue per plan of care. Precautions: Hx chiari malformation; Fibromyalgia; Thoracic spinal cord stimulator; L LAT shift  RE-EVAL:  Access Code [facility] [place]   Specialty Daily Treatment Diary     Manual  11/8 11/15 11/22     STM lumbar paraspinals        Supine to sit - LLD TEST NV L LE short to even Neutral today      Hamstring stretch B/L   NA, 5'      Piriformis Stretch B/L   NA, 5'      Prone Quad stretch            Therapeutic Exercise 11/8 11/15 11/22     Treadmill Ambulation for endurance  1. 1mph - 1. 4mph  X 4 mins 1.1-1.4 mph x 4 mins     UBE Stand ALT FWD/BACK for core/posture strength  100/70 x 4 mins /70 x 4 min Alt sitting today              MET L LE Post push R LE ANT push  Improved LLD - equal afterwards with sup->sit testing Not needed today - in neutral alignment      Ball squeeze  x5 2x10 5" with core brace     Bridges  *2x5 - add NV to HEP 2x5      Self 90-90 hamstring stretch B/L        Prone press ups 2x5 2x5 2x5      Standing back extensions x10 x10      Supine hip ABD BAND  * Red 2x10 5"                      Stand hip EXT+ABD  * nv             Neuro Re-ed        Core brace  x10 2x10 5"      kegels  x10 On exhale, 2x10 5"      Multifidi  * nv     Brace with knee fallouts        Quad DLS UE  LE  UE+LE  * nv     Quad knee lifts        Clam shells  X10 B/L 2x10 B/L      MTP/LTP/ Anti-rotations  * nv     Towel roll education, Posture correction; movement precautions DONE AD               SLB  Level hips mirror nv     Posture corrections seated        Fwd/back band walk   *nv     Sidestepping band   *nv     Heel toe Amb   *nv                     Therapeutic Activity        Steps ups fwd/side        Crate carry        Crate lifts 3 levels        Lunges        Chair squats            Modalities        MHP/CP to L/B

## 2023-11-29 ENCOUNTER — OFFICE VISIT (OUTPATIENT)
Dept: PHYSICAL THERAPY | Facility: CLINIC | Age: 53
End: 2023-11-29
Payer: COMMERCIAL

## 2023-11-29 DIAGNOSIS — M54.17 LUMBOSACRAL RADICULOPATHY: ICD-10-CM

## 2023-11-29 DIAGNOSIS — M54.16 LUMBAR RADICULOPATHY: Primary | ICD-10-CM

## 2023-11-29 PROCEDURE — 97110 THERAPEUTIC EXERCISES: CPT | Performed by: PHYSICAL THERAPIST

## 2023-11-29 PROCEDURE — 97112 NEUROMUSCULAR REEDUCATION: CPT | Performed by: PHYSICAL THERAPIST

## 2023-11-29 PROCEDURE — 97140 MANUAL THERAPY 1/> REGIONS: CPT | Performed by: PHYSICAL THERAPIST

## 2023-11-29 NOTE — PROGRESS NOTES
Daily Note     Today's date: 2023  Patient name: Alix Quinn  : 1970  MRN: [id number]  Referring provider: Marco Antonio Peralta PA-C  Dx:   Encounter Diagnosis     ICD-10-CM    1. Lumbar radiculopathy  M54.16       2. Lumbosacral radiculopathy  M54.17           Start Time: 0945  Stop Time: 1030  Total time in clinic (min): 45 minutes    Subjective: States some increased pain today. Located along right anterior lateral thigh into lateral hip and RLB. Overall continues to have constant pain. Objective: See treatment diary below      Assessment: Tolerated treatment well. Noted restriction of thoracic PA mobility > lumbar with hinge joint at lumbar region during extension activity. May benefit from thoracic mobilization to decrease stress on lumbar region. Patient would benefit from continued PT      Plan: Continue per plan of care. Precautions: Hx chiari malformation; Fibromyalgia; Thoracic spinal cord stimulator; L LAT shift  RE-EVAL:  Access Code [facility]   Specialty Daily Treatment Diary     Manual  11/8 11/15 11/22 11/29    STM lumbar paraspinals        Supine to sit - LLD TEST NV L LE short to even Neutral today  WNL    PA mobs    Grade III-IV lumbar and thoracic     Hamstring stretch B/L   NA, 5'      Piriformis Stretch B/L   NA, 5'      Prone Quad stretch            Therapeutic Exercise 11/8 11/15 11/22 11/29    Treadmill Ambulation for endurance  1. 1mph - 1. 4mph  X 4 mins 1.1-1.4 mph x 4 mins 1.1-1.4 mph x 4 mins    UBE Stand ALT FWD/BACK for core/posture strength  100/70 x 4 mins /70 x 4 min Alt sitting today  100/70 x 4 min Alt standing            MET L LE Post push R LE ANT push  Improved LLD - equal afterwards with sup->sit testing Not needed today - in neutral alignment      Ball squeeze  x5 2x10 5" with core brace 2x10    Bridges  *2x5 - add NV to HEP 2x5  2x5     Self 90-90 hamstring stretch B/L        Prone press ups 2x5 2x5 2x5  10x with clinician over pressure Standing back extensions x10 x10  reviewed    Supine hip ABD BAND  * Red 2x10 5"  Red 2x10 5"     Open book rotation    :10x10    Seated thoracic ext with towel    10x    Stand hip EXT+ABD  * nv             Neuro Re-ed        Core brace  x10 2x10 5"  1x10 5"     kegels  x10 On exhale, 2x10 5"  1x10 5"     Multifidi  * nv Standing at table :03x10    Brace with knee fallouts        Quad DLS UE  LE  UE+LE  * nv     Quad knee lifts        Clam shells  X10 B/L 2x10 B/L  2x10 bilat    MTP/LTP/ Anti-rotations  * nv     Towel roll education, Posture correction; movement precautions DONE AD               SLB  Level hips mirror nv :15x2    Posture corrections seated        Fwd/back band walk    Tandem/backward // bars 4x    Sidestepping band   *nv YTB 4x // bars    Heel toe Amb   *nv                     Therapeutic Activity        Steps ups fwd/side        Crate carry        Crate lifts 3 levels        Lunges        Chair squats            Modalities        MHP/CP to L/B

## 2023-12-04 NOTE — PROGRESS NOTES
PT Re-Evaluation  and PT Discharge    Today's date: 2023  Patient name: Katlin Baumann  : 1970  MRN: [id number]  Referring provider: Angie Cabrera PA-C  Dx:   Encounter Diagnosis     ICD-10-CM    1. Lumbar radiculopathy  M54.16       2. Lumbosacral radiculopathy  M54.17                      Assessment  Assessment details: Katlin Baumann is a 48 y.o. female with a history of chronic pain, CPAP, fibromyalgia, Chiari malformation, RLS, BMI>30, and sleep apnea that presents for a physical therapy RE-evaluation. The patient demonstrates signs and symptoms consistent with Lumbar / Lumbosacral Radiculopathy. During the examination the patient demonstrated improved core/LE strength, improved but impaired Lumbar ROM, variable activity tolerance, and unchanged lumbar pain. The patient notes her pain varies and at times she is able to function without difficulty. The patient continues to have difficulty with lying on her hips and ambulate up/down the steps. The patient feels she is independent with her HEP and is able to discharge formal PT and continue at home. Functional limitations: difficulty bending/stooping, difficulty lifting/carrying objects, difficulty stair-climbing  Symptom irritability: lowUnderstanding of Dx/Px/POC: good   Prognosis: good  Prognosis details: Positive prognostic indicators include: positive attitude toward recovery, good understanding of diagnosis/treatment plan, and absence of observed red flags. Negative prognostic indicators include: chronicity of symptoms, and minimal changes in pain with movement. Goals  STG: Achieve in 4-6 weeks  1. Decrease lumbar pain at worst by 50% to improve activity tolerance for self/care and leisure activities. NOT MET  2. Improve core/LE strength by 1/2-1 muscle grade to improve ability to change body positions without difficulty. PARTIALLY MET  3.   Improve Lumbar ROM to " minimal restriction" to facilitate normal movement patterns for ADL's/work tasks. NOT MET    LTG: Achieve in 8-12 weeks  1. Patient's FOTO score improve to > 70% to indicate a return to normal functioning. MET   2. Patient tolerate reduce pain, position better, and gain strength to achieve patient specific goal. PARTIALLY MET  3. Patient achieve independence with performing home exercise plan. MET        Plan  Plan details: D/C PT TO AN INDEPENDENT HEP  Treatment plan discussed with: PTA and patient      Subjective Evaluation    History of Present Illness  Mechanism of injury: SUBJECTIVE: 23: The patient reports feeling "a lot of the same" since starting therapy. The patient notes her pain varies and at times she is able to function without difficulty. The patient continues to have difficulty with lying on her hips and ambulate up/down the steps. The patient feels she is independent with her HEP and is able to discharge formal PT and continue at home. INJURY HISTORY: Kallie Darby is a 48 y.o. female that presents to outpatient physical therapy with complaints of chronic low back less than 10 years. The patient denies any GARY for onset of LBP. The patient reports having a neuro stimulator at her mid back. The patient gets relief from moist heat. The patient notes her pain does travel into her hips - difficulty with lying on her B/L hips. The patient notes she needs to do PT to get an MRI. The patient notes difficulty with ambulating up/down steps - 1 step at a time. The patient's main goal for physical therapy is to decrease her pain levels and position better.              Recurrent probem    Patient Goals  Patient goal: improve positioning / reduce pain / improve strength ( partially met)  Pain  Current pain rating: 3  At best pain ratin  At worst pain ratin  Location: R low back, R buttocks, R lateral hip/thigh  Quality: dull ache  Relieving factors: heat  Aggravating factors: stair climbing, standing, walking and sitting  Progression: no change    Social Support  Steps to enter house: yes  Stairs in house: yes   Lives in: multiple-level home  Lives with: spouse    Employment status: working ( [employer])  Treatments  Previous treatment: injection treatment and physical therapy (thoracic neuro stimulator - upcoming injection SIJ area)      Objective     Concurrent Complaints  Positive for disturbed sleep.  Negative for night pain, bladder dysfunction, bowel dysfunction, saddle (S4) numbness, history of cancer, history of trauma and infection    Postural Observations  Seated posture: fair  Standing posture: fair  Correction of posture: has no consistent effect    Additional Postural Observation Details  L lateral shift    Neurological Testing     Sensation     Lumbar   Left   Intact: light touch    Right   Intact: light touch    Reflexes   Left   Patellar (L4): normal (2+)  Achilles (S1): normal (2+)  Babinski sign: negative    Right   Patellar (L4): normal (2+)  Achilles (S1): normal (2+)  Babinski sign: negative    Active Range of Motion     Lumbar   Flexion:  Restriction level: minimal  Extension:  Restriction level: moderate  Left lateral flexion:  Restriction level: minimal  Right lateral flexion:  Restriction level: minimal  Left rotation:  Restriction level: moderate  Right rotation:  Restriction level: moderate    Additional Active Range of Motion Details  LESS PAIN  Mechanical Assessment    Cervical      Thoracic      Lumbar    Standing extension: repeated movements  Pain location: no change  Lying extension: repeated movements  Pain intensity: better    Strength/Myotome Testing     Left Hip   Planes of Motion   Flexion: 4+  Abduction: 4  Adduction: 4+    Right Hip   Planes of Motion   Flexion: 4+  Abduction: 4  Adduction: 4+    Left Knee   Flexion: 4  Extension: 5    Right Knee   Flexion: 4  Extension: 4    Left Ankle/Foot   Dorsiflexion: 5  Plantar flexion: 5  Great toe extension: 5    Right Ankle/Foot   Dorsiflexion: 5  Plantar flexion: 5  Great toe extension: 5    Additional Strength Details  Improved hip strength    Muscle Activation     Additional Muscle Activation Details  Improved but TrA, multifidus, gluteal activation with transfers / position changes / dynamic movement      Tests     Lumbar   Positive sacral spring . Negative SIJ compression. Left   Negative slump test.     Right   Negative slump test.     Left Pelvic Girdle/Sacrum   Negative: active SLR test.     Right Pelvic Girdle/Sacrum   Negative: active SLR test.     Left Hip   Negative FADIR. Right Hip   Positive FADIR. Additional Tests Details  FOTO: 71% ( predicted 62%) ( improved)             Precautions: Hx chiari malformation; Fibromyalgia; Thoracic spinal cord stimulator; L LAT shift  RE-EVAL:1/3  Access Code [facility]   Specialty Daily Treatment Diary     Manual  11/8 11/15 11/22 11/29 12/6   STM lumbar paraspinals        Supine to sit - LLD TEST NV L LE short to even Neutral today  WNL    PA mobs    Grade III-IV lumbar and thoracic  GRADE 3-4 lumbar/thoracic mob   Hamstring stretch B/L   NA, 5'      Piriformis Stretch B/L   NA, 5'      Prone Quad stretch            Therapeutic Exercise 11/8 11/15 11/22 11/29 12/6   Treadmill Ambulation for endurance  1. 1mph - 1. 4mph  X 4 mins 1.1-1.4 mph x 4 mins 1.1-1.4 mph x 4 mins 1.4 MPH-1.6 MPH X 5 MINS   UBE Stand ALT FWD/BACK for core/posture strength  100/70 x 4 mins /70 x 4 min Alt sitting today  100/70 x 4 min Alt standing 90/70 x 4 min           MET L LE Post push R LE ANT push  Improved LLD - equal afterwards with sup->sit testing Not needed today - in neutral alignment      Ball squeeze  x5 2x10 5" with core brace 2x10 REVIEWED   Bridges  *2x5 - add NV to HEP 2x5  2x5  REVIEWED   Self 90-90 hamstring stretch B/L        Prone press ups 2x5 2x5 2x5  10x with clinician over pressure x10   Standing back extensions x10 x10  reviewed X10   Supine hip ABD BAND  * Red 2x10 5"  Red 2x10 5"     Open book rotation    :10x10    Seated thoracic ext with towel    10x    Stand hip EXT+ABD  * nv             Neuro Re-ed        Core brace  x10 2x10 5"  1x10 5"     kegels  x10 On exhale, 2x10 5"  1x10 5"     Multifidi  * nv Standing at table :03x10    Brace with knee fallouts        Quad DLS UE  LE  UE+LE  * nv     Quad knee lifts        Clam shells  X10 B/L 2x10 B/L  2x10 bilat REVIEWED   MTP/LTP/ Anti-rotations  * nv     Towel roll education, Posture correction; movement precautions DONE AD               SLB  Level hips mirror nv :15x2    Posture corrections seated        Fwd/back band walk    Tandem/backward // bars 4x    Sidestepping band   *nv YTB 4x // bars    Heel toe Amb   *nv                     Therapeutic Activity        Steps ups fwd/side        Crate carry        Crate lifts 3 levels        Lunges        Chair squats            Modalities        MHP/CP to L/B

## 2023-12-06 ENCOUNTER — EVALUATION (OUTPATIENT)
Dept: PHYSICAL THERAPY | Facility: CLINIC | Age: 53
End: 2023-12-06
Payer: COMMERCIAL

## 2023-12-06 DIAGNOSIS — M54.17 LUMBOSACRAL RADICULOPATHY: ICD-10-CM

## 2023-12-06 DIAGNOSIS — M54.16 LUMBAR RADICULOPATHY: Primary | ICD-10-CM

## 2023-12-06 PROCEDURE — 97140 MANUAL THERAPY 1/> REGIONS: CPT | Performed by: PHYSICAL THERAPIST

## 2023-12-06 PROCEDURE — 97110 THERAPEUTIC EXERCISES: CPT | Performed by: PHYSICAL THERAPIST

## 2024-05-23 NOTE — ASSESSMENT & PLAN NOTE
Comprehensive Nutrition Assessment    Type and Reason for Visit:  Initial, Positive Nutrition Screen    Nutrition Recommendations/Plan:   Recommend and start Ensure plus high protein supplement BID and Magic cup supplement daily to help meet nutritional needs and d/t decreased po intake of meals.         Malnutrition Assessment:  Malnutrition Status:  At risk for malnutrition (Comment) (05/23/24 1054)    Context:  Acute Illness     Findings of the 6 clinical characteristics of malnutrition:  Energy Intake:  Mild decrease in energy intake (Comment) (since admission)  Weight Loss:  Unable to assess (SANDRO d/t lack of wt hx per EMR ; Subjective wt gain w/in last few months per pt ~125#>165#>155#)     Body Fat Loss:  Unable to assess     Muscle Mass Loss:  Unable to assess    Fluid Accumulation:        Strength:  Not Performed    Nutrition Assessment:    Pt presented d/t N/V/abd pain ; noted dehydration, high anion gap metabolic acidosis, hyperemesis and ketosis ; pt reported drinking 24oz of beer per day per note ; recent adm 4/20/2024 d/t abd pain likely secondary to gastroparesis versus cannabis hyperemesis syndrome ; PMHx of EtOH abuse, tobacco and polysubstance use, bipolar disorder, delayed gastric emptying, gastroparesis, IBS, asthma ; pt at nutritional risk d/t ongoing poor PO intake ; pt drinks Ensure at home and is willing to try Magic Cup ; Will modify ONS and monitor.    Nutrition Related Findings:    I/O's, A&Ox4, nausea, no recent emesis per pt, +BS, no edema Wound Type: None       Current Nutrition Intake & Therapies:    Average Meal Intake: 0%  Average Supplements Intake: Unable to assess  ADULT DIET; Regular  ADULT ORAL NUTRITION SUPPLEMENT; Breakfast, Lunch, Dinner; Standard High Calorie/High Protein Oral Supplement    Anthropometric Measures:  Height: 175.3 cm (5' 9.02\")  Ideal Body Weight (IBW): 160 lbs (73 kg)    Admission Body Weight:  (SANDRO d/t lack of measured wts per EMR)  Current Body Weight:  · As addressed in HPI  · Chronic current use of opiates  · Status post spinal cord simulator  trial , scheduled for surgery for permanent placement

## 2024-08-21 DIAGNOSIS — Z00.6 ENCOUNTER FOR EXAMINATION FOR NORMAL COMPARISON OR CONTROL IN CLINICAL RESEARCH PROGRAM: ICD-10-CM

## 2024-08-28 ENCOUNTER — APPOINTMENT (OUTPATIENT)
Dept: LAB | Facility: CLINIC | Age: 54
End: 2024-08-28

## 2024-08-28 DIAGNOSIS — Z00.6 ENCOUNTER FOR EXAMINATION FOR NORMAL COMPARISON OR CONTROL IN CLINICAL RESEARCH PROGRAM: ICD-10-CM

## 2024-08-28 PROCEDURE — 36415 COLL VENOUS BLD VENIPUNCTURE: CPT

## 2024-09-10 LAB
APOB+LDLR+PCSK9 GENE MUT ANL BLD/T: NOT DETECTED
BRCA1+BRCA2 DEL+DUP + FULL MUT ANL BLD/T: NOT DETECTED
MLH1+MSH2+MSH6+PMS2 GN DEL+DUP+FUL M: NOT DETECTED

## (undated) DEVICE — DRAPE C-ARMOUR

## (undated) DEVICE — INTENDED FOR TISSUE SEPARATION, AND OTHER PROCEDURES THAT REQUIRE A SHARP SURGICAL BLADE TO PUNCTURE OR CUT.: Brand: BARD-PARKER SAFETY BLADES SIZE 10, STERILE

## (undated) DEVICE — UTILITY MARKER,BLACK WITH LABELS: Brand: DEVON

## (undated) DEVICE — DRAPE C-ARM X-RAY

## (undated) DEVICE — SUT SILK 2-0 SH 30 IN K833H

## (undated) DEVICE — GLOVE INDICATOR PI UNDERGLOVE SZ 7 BLUE

## (undated) DEVICE — ELECTRODE BLADE MOD E-Z CLEAN 2.5IN 6.4CM -0012M

## (undated) DEVICE — GLOVE SRG BIOGEL ECLIPSE 7

## (undated) DEVICE — SYRINGE EPI 8ML LUER SLIP LOSS OF RESISTANCE PLASTIC PERFIX

## (undated) DEVICE — PREP SURGICAL PURPREP 26ML

## (undated) DEVICE — ANTIBACTERIAL VIOLET BRAIDED (POLYGLACTIN 910), SYNTHETIC ABSORBABLE SUTURE: Brand: COATED VICRYL

## (undated) DEVICE — 10FR FRAZIER SUCTION HANDLE: Brand: CARDINAL HEALTH

## (undated) DEVICE — TUBING SUCTION 5MM X 12 FT

## (undated) DEVICE — BETHLEHEM UNIVERSAL MINOR GEN: Brand: CARDINAL HEALTH

## (undated) DEVICE — Device

## (undated) DEVICE — NEEDLE HYPO 22G X 1-1/2 IN

## (undated) DEVICE — GLOVE INDICATOR PI UNDERGLOVE SZ 6.5 BLUE

## (undated) DEVICE — VIAL DECANTER

## (undated) DEVICE — RECHARGER PRGRMR THERAPY MANAGER RS2

## (undated) DEVICE — SUT MONOCRYL 4-0 PS-2 27 IN Y426H

## (undated) DEVICE — TIBURON SPLIT SHEET: Brand: CONVERTORS

## (undated) DEVICE — 3M™ IOBAN™ 2 ANTIMICROBIAL INCISE DRAPE 6650EZ: Brand: IOBAN™ 2

## (undated) DEVICE — GLOVE SRG BIOGEL 6.5

## (undated) DEVICE — RX COUDÉ® EPIDURAL NEEDLE 14G TW X 4.0": Brand: EPIMED

## (undated) DEVICE — GLOVE INDICATOR PI UNDERGLOVE SZ 7.5 BLUE

## (undated) DEVICE — GLOVE SRG BIOGEL 7

## (undated) DEVICE — ADHESIVE SKIN HIGH VISCOSITY EXOFIN 1ML

## (undated) DEVICE — SPONGE SCRUB 4 PCT CHLORHEXIDINE

## (undated) DEVICE — PROGRAMMER EXTRNL WIRELESS NEURO STIM RS2

## (undated) DEVICE — PENCIL ELECTROSURG E-Z CLEAN -0035H

## (undated) DEVICE — PROGRAMMER PATIENT THERAPY MANAGER RS2